# Patient Record
Sex: MALE | Race: WHITE | Employment: OTHER | URBAN - METROPOLITAN AREA
[De-identification: names, ages, dates, MRNs, and addresses within clinical notes are randomized per-mention and may not be internally consistent; named-entity substitution may affect disease eponyms.]

---

## 2017-01-10 ENCOUNTER — TELEPHONE (OUTPATIENT)
Dept: FAMILY MEDICINE | Facility: CLINIC | Age: 62
End: 2017-01-10

## 2017-01-10 DIAGNOSIS — K40.90 RIGHT INGUINAL HERNIA: Primary | ICD-10-CM

## 2017-01-10 NOTE — TELEPHONE ENCOUNTER
Patient given referral information and repeated the scheduling numbers back to me.  Mariely Ortiz RN

## 2017-01-10 NOTE — TELEPHONE ENCOUNTER
Reason for call: Other   Patient called regarding (reason for call): needs to have surgery done on his hernia   Additional comments:     Phone Number Pt can be reached at: Home number on file [phone] (home)  Best Time: anytime  Can we leave a detailed message on this number? YES

## 2017-01-12 ENCOUNTER — OFFICE VISIT (OUTPATIENT)
Dept: SURGERY | Facility: CLINIC | Age: 62
End: 2017-01-12
Payer: COMMERCIAL

## 2017-01-12 VITALS
WEIGHT: 155 LBS | BODY MASS INDEX: 24.91 KG/M2 | HEIGHT: 66 IN | SYSTOLIC BLOOD PRESSURE: 119 MMHG | DIASTOLIC BLOOD PRESSURE: 79 MMHG | HEART RATE: 71 BPM

## 2017-01-12 DIAGNOSIS — K40.91 UNILATERAL RECURRENT INGUINAL HERNIA WITHOUT OBSTRUCTION OR GANGRENE: Primary | ICD-10-CM

## 2017-01-12 PROCEDURE — 99204 OFFICE O/P NEW MOD 45 MIN: CPT | Performed by: SURGERY

## 2017-01-12 NOTE — MR AVS SNAPSHOT
After Visit Summary   1/12/2017    Arthur Jefferson    MRN: [id number]           Patient Information     Date Of Birth          1955        Visit Information        Provider Department      1/12/2017 9:00 AM Kwame Prado MD [facility]      Care Instructions    Your surgery is scheduled for 1/18/17 3:00 pm at [facility]        Follow-ups after your visit        Your next 10 appointments already scheduled     Jan 18, 2017  9:00 AM   (Arrive by 8:45 AM)   NEW HERNIA SURGERY with Sean Momin MD   [facility] ([facility])    [address]  4th Floor  [facility] [address] [phone]           Do not wear perfume.            Jan 18, 2017   Procedure with Kwame Prado MD   [facility] (--)    [address]  [facility] [address] [phone]              Who to contact     If you have questions or need follow up information about today's clinic visit or your schedule please contact [facility] directly at [phone].  Normal or non-critical lab and imaging results will be communicated to you by MyChart, letter or phone within 4 business days after the clinic has received the results. If you do not hear from us within 7 days, please contact the clinic through [employer] or phone. If you have a critical or abnormal lab result, we will notify you by phone as soon as possible.  Submit refill requests through [employer] or call your pharmacy and they will forward the refill request to us. Please allow 3 business days for your refill to be completed.          Additional Information About Your Visit        KlickExharPager Information     [employer] lets you send messages to your doctor, view your test results, renew your prescriptions, schedule appointments and more. To sign up, go to  [email or web address] . Click on \"Log in\" on the left side of the screen, which will take you to the Welcome page. Then click on \"Sign up Now\" on the right side of the page.     You will be asked to enter the access code listed below, as well as some personal information. Please follow the directions to create your username and password.     Your access code is: [email or web address]  Expires: 3/1/2017  9:22 AM     Your access code will  in 90 days. If you need help or a new code, please call your [facility] clinic or [phone].        Care EveryWhere ID     This is your Care EveryWhere ID. This could be used by other organizations to access your [facility] medical records  [id number]        Your Vitals Were     Pulse Height BMI (Body Mass Index)             71 5' 6\" (1.676 m) 25.03 kg/m2          Blood Pressure from Last 3 Encounters:   17 119/79   16 122/60   14 116/74    Weight from Last 3 Encounters:   17 155 lb (70.308 kg)   16 158 lb (71.668 kg)   10/24/11 160 lb (72.576 kg)              Today, you had the following     No orders found for display       Primary Care Provider Office Phone # Fax #    Naty Medeiros [phone]       [facility] [address]  [facility] [address]        Thank you!     Thank you for choosing [facility]  for your care. Our goal is always to provide you with excellent care. Hearing back from our patients is one way we can continue to improve our services. Please take a few minutes to complete the written survey that you may receive in the mail after your visit with us. Thank you!             Your Updated Medication List - Protect others around you: Learn how to safely use, store and throw away your medicines at www.disposemymeds.org.      Notice  As of 2017 10:26 AM    You have not been prescribed any medications.      "

## 2017-01-12 NOTE — PROGRESS NOTES
"[facility]  Surgery Consultation    HPI: Patient is a 62 year old male who is here for consultation requested by Naty Medeiros [phone] for evaluation of recurrent right inguinal hernia. He has a history of open repair and 1990 on the right side. He had a left laparoscopic repair in 2005. He noticed a few weeks ago that the right side popped open and he has had a palpable lump there since. He is always able to reduce the hernia but is having more symptoms from now. Pain is primarily located in the right groin. Patient states pain is worse with excercise. Pain is rated as mild. Symptoms are improved with rest and lying flat. Hernia has not been incarcerated. Patient has not had any symptoms of bowel obstruction. Patient denies fevers, chills, nausea, vomiting, SOB, chest pain, abdominal pain..    PMH:   has a past medical history of Hyperlipidemia LDL goal <130.  PSH:    has past surgical history that includes Herniorrhaphy inguinal.  Social History:   reports that he quit smoking about 8 years ago. He does not have any smokeless tobacco history on file. He reports that he drinks alcohol. He reports that he does not use illicit drugs.  Family History:   family history includes CANCER in his mother; Prostate Cancer in his father. There is no history of Asthma, DIABETES, Hypertension, CEREBROVASCULAR DISEASE, Breast Cancer, Cancer - colorectal, Alcohol/Drug, Allergies, Alzheimer Disease, Arthritis, Blood Disease, Cardiovascular, Depression, Eye Disorder, GASTROINTESTINAL DISEASE, HEART DISEASE, Lipids, Musculoskeletal Disorder, Neurologic Disorder, Obesity, Psychotic Disorder, Respiratory, or Thyroid Disease.  Medications/Allergies: Home medications and allergies reviewed.    ROS:  The 10 point Review of Systems is negative other than noted in the HPI.    Physical Exam:  /79 mmHg  Pulse 71  Ht 5' 6\" (1.676 m)  Wt 155 lb (70.308 kg)  BMI 25.03 kg/m2  GENERAL: Generally appears " well.  Psych: Alert and Oriented.  Normal affect  Eyes: Sclera clear  Respiratory:  Lungs clear to ausculation bilaterally with good air excursion  Cardiovascular:  Regular Rate and Rhythm with no murmurs gallops or rubs, normal peripheral pulses  GI: Abdomen Soft Non-Tender entire abdomen No hernias palpated..  Groin- I examined the patient in both the standing and supine positions. Right Groin- Moderate sized inguinal hernia.  Hernia was easily reduciable.  Tenderness palpated in groin. Left Groin- No hernia Palpated. No scrotal or testicle abnormalities.  Lymphatic/Hematologic/Immune:  No femoral or cervical lymphadenopathy.  Integumentary:  No rashes  Neurological: grossly intact     All new lab and imaging data was reviewed.     Impression and Plan:  Patient is a 62 year old male with recurrent right inguinal hernia    PLAN:   I discussed the pathophysiology of hernias and options for repair including laparoscopic VS open. The patient has a recurrent right inguinal hernia. He has had an open repair on this side and a laparoscopic repair of the left side. I would recommend going forward with a laparoscopic repair with mesh if possible. He understands if we cannot get in the appropriate planes that we would need to possibly convert open. He also is aware that the complication rate is higher given his previous repairs in the past. He would like to go forward in the near future as its hindering his current work schedule. He can schedule at his convenience. The risks associated with the procedure including, but not limited to, recurrence, nerve entrapment or injury, persistence of pain, injury to the bowel/bladder, infertility, hematoma, mesh migration, mesh infection, MI, and PE were discussed with the patient. He indicated understanding of the discussion, asked appropriate questions, and provided consent. Signs and symptoms of incarceration were discussed. If these develop in the interim, he promises to call or go  straight to the ER. I have provided the patient with an information pamphlet.      Thank you very much for this consult.    Kwame Prado M.D.  [facility]  [phone]    Please route or send letter to:  Primary Care Provider (PCP) and Referring Provider    HPI      ROS      Physical Exam

## 2017-01-12 NOTE — Clinical Note
"  2017    [facility]  Surgery Consultation    RE:  Arthur Jefferson-:  [birth year]    HPI: Patient is a 62 year old male who is here for consultation requested by Naty Medeiros [phone] for evaluation of recurrent right inguinal hernia. He has a history of open repair and 1990 on the right side. He had a left laparoscopic repair in . He noticed a few weeks ago that the right side popped open and he has had a palpable lump there since. He is always able to reduce the hernia but is having more symptoms from now. Pain is primarily located in the right groin. Patient states pain is worse with excercise. Pain is rated as mild. Symptoms are improved with rest and lying flat. Hernia has not been incarcerated. Patient has not had any symptoms of bowel obstruction. Patient denies fevers, chills, nausea, vomiting, SOB, chest pain, abdominal pain..    PMH:   has a past medical history of Hyperlipidemia LDL goal <130.  PSH:    has past surgical history that includes Herniorrhaphy inguinal.  Social History:   reports that he quit smoking about 8 years ago. He does not have any smokeless tobacco history on file. He reports that he drinks alcohol. He reports that he does not use illicit drugs.  Family History:   family history includes CANCER in his mother; Prostate Cancer in his father. There is no history of Asthma, DIABETES, Hypertension, CEREBROVASCULAR DISEASE, Breast Cancer, Cancer - colorectal, Alcohol/Drug, Allergies, Alzheimer Disease, Arthritis, Blood Disease, Cardiovascular, Depression, Eye Disorder, GASTROINTESTINAL DISEASE, HEART DISEASE, Lipids, Musculoskeletal Disorder, Neurologic Disorder, Obesity, Psychotic Disorder, Respiratory, or Thyroid Disease.  Medications/Allergies: Home medications and allergies reviewed.    ROS:  The 10 point Review of Systems is negative other than noted in the HPI.    Physical Exam:  /79 mmHg  Pulse 71  Ht 5' 6\" (1.676 m)  Wt 155 lb " (70.308 kg)  BMI 25.03 kg/m2  GENERAL: Generally appears well.  Psych: Alert and Oriented.  Normal affect  Eyes: Sclera clear  Respiratory:  Lungs clear to ausculation bilaterally with good air excursion  Cardiovascular:  Regular Rate and Rhythm with no murmurs gallops or rubs, normal peripheral pulses  GI: Abdomen Soft Non-Tender entire abdomen No hernias palpated..  Groin- I examined the patient in both the standing and supine positions. Right Groin- Moderate sized inguinal hernia.  Hernia was easily reduciable.  Tenderness palpated in groin. Left Groin- No hernia Palpated. No scrotal or testicle abnormalities.  Lymphatic/Hematologic/Immune:  No femoral or cervical lymphadenopathy.  Integumentary:  No rashes  Neurological: grossly intact     All new lab and imaging data was reviewed.     Impression and Plan:  Patient is a 62 year old male with recurrent right inguinal hernia    PLAN:   I discussed the pathophysiology of hernias and options for repair including laparoscopic VS open. The patient has a recurrent right inguinal hernia. He has had an open repair on this side and a laparoscopic repair of the left side. I would recommend going forward with a laparoscopic repair with mesh if possible. He understands if we cannot get in the appropriate planes that we would need to possibly convert open. He also is aware that the complication rate is higher given his previous repairs in the past. He would like to go forward in the near future as its hindering his current work schedule. He can schedule at his convenience. The risks associated with the procedure including, but not limited to, recurrence, nerve entrapment or injury, persistence of pain, injury to the bowel/bladder, infertility, hematoma, mesh migration, mesh infection, MI, and PE were discussed with the patient. He indicated understanding of the discussion, asked appropriate questions, and provided consent. Signs and symptoms of incarceration were discussed.  If these develop in the interim, he promises to call or go straight to the ER. I have provided the patient with an information pamphlet.      Thank you very much for this consult.    Kwame Prado M.D.  [facility]  [phone]

## 2017-01-16 ENCOUNTER — OFFICE VISIT (OUTPATIENT)
Dept: FAMILY MEDICINE | Facility: CLINIC | Age: 62
End: 2017-01-16
Payer: COMMERCIAL

## 2017-01-16 VITALS
BODY MASS INDEX: 26 KG/M2 | OXYGEN SATURATION: 98 % | RESPIRATION RATE: 16 BRPM | WEIGHT: 161 LBS | HEART RATE: 68 BPM | SYSTOLIC BLOOD PRESSURE: 100 MMHG | DIASTOLIC BLOOD PRESSURE: 70 MMHG | TEMPERATURE: 97.7 F

## 2017-01-16 DIAGNOSIS — Z01.818 PREOP GENERAL PHYSICAL EXAM: Primary | ICD-10-CM

## 2017-01-16 DIAGNOSIS — K40.90 RIGHT INGUINAL HERNIA: ICD-10-CM

## 2017-01-16 LAB — HGB BLD-MCNC: 14.6 G/DL (ref 13.3–17.7)

## 2017-01-16 PROCEDURE — 99214 OFFICE O/P EST MOD 30 MIN: CPT | Performed by: FAMILY MEDICINE

## 2017-01-16 PROCEDURE — 36415 COLL VENOUS BLD VENIPUNCTURE: CPT | Performed by: FAMILY MEDICINE

## 2017-01-16 PROCEDURE — 85018 HEMOGLOBIN: CPT | Performed by: FAMILY MEDICINE

## 2017-01-16 PROCEDURE — 93000 ELECTROCARDIOGRAM COMPLETE: CPT | Performed by: FAMILY MEDICINE

## 2017-01-16 NOTE — INTERVAL H&P NOTE
This note is for the purpose of making the H&P performed in the clinic within the last 30 days available in the hospital surgical encounter.

## 2017-01-16 NOTE — PROGRESS NOTES
[place] [id number] [place] [address] [phone]  Dept: [phone]    PRE-OP EVALUATION:  Today's date: 2017    Arthur Jefferson (: 1955) presents for pre-operative evaluation assessment as requested by Kwame Ugarte MD.  He requires evaluation and anesthesia risk assessment prior to undergoing surgery/procedure for treatment of recurrent right inguinal hernia.  Proposed procedure: Laparoscopic right inguinal hernia repair with mesh    Date of Surgery/ Procedure: 2017  Time of Surgery/ Procedure: 2:55 pm   Hospital/Surgical [facility]  Primary Physician: Naty Medeiros  Type of Anesthesia Anticipated: General    Patient has a Health Care Directive or Living Will:  NO    1. NO - Do you have a history of heart attack, stroke, stent, bypass or surgery on an artery in the head, neck, heart or legs?  2. NO - Do you ever have any pain or discomfort in your chest?  3. NO - Do you have a history of  Heart Failure?  4. NO - Are you troubled by shortness of breath when: walking on the level, up a slight hill or at night?  5. NO - Do you currently have a cold, bronchitis or other respiratory infection?  6. NO - Do you have a cough, shortness of breath or wheezing?  7. NO - Do you sometimes get pains in the calves of your legs when you walk?  8. NO - Do you or anyone in your family have previous history of blood clots?  9. NO - Do you or does anyone in your family have a serious bleeding problem such as prolonged bleeding following surgeries or cuts?  10. NO - Have you ever had problems with anemia or been told to take iron pills?  11. NO - Have you had any abnormal blood loss such as black, tarry or bloody stools, or abnormal vaginal bleeding?  12. NO - Have you ever had a blood transfusion?  13. NO - Have you or any of your relatives ever had problems with anesthesia?  14. NO - Do you have sleep apnea, excessive snoring or daytime  drowsiness?  15. NO - Do you have any prosthetic heart valves?  16. NO - Do you have prosthetic joints?  17. NO - Is there any chance that you may be pregnant?      HPI:                                                      Brief HPI related to upcoming procedure: Had bilateral inguinal hernia repair years ago.  Recently re-developed right inguinal hernia.  Has had a couple episodes of incarceration that he has reduced himself.   Both brother and father have had multiple hernias.        See problem list for active medical problems.  Problems all longstanding and stable, except as noted/documented.  See ROS for pertinent symptoms related to these conditions.                   MEDICAL HISTORY:                                                      Patient Active Problem List    Diagnosis Date Noted     Elevated fasting glucose 12/02/2016     Priority: Medium     Mixed hyperlipidemia 05/09/2010     Priority: Medium      Past Medical History   Diagnosis Date     Hyperlipidemia LDL goal <130      Past Surgical History   Procedure Laterality Date     Herniorrhaphy inguinal       bilateral, one open, one laparoscopic         No current outpatient prescriptions on file.     OTC products: no recent use of OTC ASA, NSAIDS or Steroids    No Known Allergies   Latex Allergy: NO    Social History   Substance Use Topics     Smoking status: Light Tobacco Smoker     Last Attempt to Quit: 11/30/2008     Smokeless tobacco: Not on file     Alcohol Use: 1.2 oz/week     2 Standard drinks or equivalent per week     History   Drug Use No       REVIEW OF SYSTEMS:                                                    ROS:  CONST: NEGATIVE for fevers/chills/sweats, unexplained weight loss/gain, and fatigue  EYES: NEGATIVE for change in vision  ENT: NEGATIVE for difficulty hearing/tinnitus, and problems with teeth/gums  BREAST: NEGATIVE for breast lump/discharge  CV: NEGATIVE for chest pain/discomfort, leg pain with exercise, and palpitations  RESP:  NEGATIVE for cough/wheeze, and difficulty breathing  GI: NEGATIVE for abdominal pain, blood in bowel movement, and nausea/vomiting/diarrhea  : NEGATIVE for nighttime urination, leaking urine, unusual vaginal bleeding, penile/vaginal discharge, and concerns about sexual function  MS: NEGATIVE for muscle/joint pain  SKIN: NEGATIVE for rash or mole change  NEURO: NEGATIVE for headache, dizziness/lightheadedness, numbness, memory loss, and loss of coordination  PSYCH: NEGATIVE for anxiety/stress, problems with sleep, and depression  HEME: NEGATIVE for unexplained lumps, and easy bruising/bleeding  ENDO: NEGATIVE for excessive thirst or urination  ALL: NEGATIVE for hay fever/allergies     EXAM:                                                    /70 mmHg  Pulse 68  Temp(Src) 97.7  F (36.5  C) (Oral)  Resp 16  Wt 161 lb (73.029 kg)  SpO2 98%  GEN:  no apparent distress  EYES: PERRL, conjunctivae and sclerae clear   ENT: external ears and nose without lesions or scars, TM's and canals clear bilaterally, oropharynx clear with moist mucus membranes and normal landmarks and lips, teeth, and gums with no abnormalities noted   NECK:  Supple without adenopathy, mass, or thyromegaly   LUNGS:  normal respiratory effort, and lungs clear to auscultation bilaterally - no rales, rhonchi or wheezes  CV: regular rate and rhythm, normal S1 S2, no S3 or S4, no murmur, click or rub and bilateral lower extremities without edema   ABD:  soft, nontender, no hepatosplenomegaly  SKIN:  normal to inspection and palpation, no rashes or abnormal-appearing lesions   PSYCH: mood/affect appear normal/bright     DIAGNOSTICS:                                                      EKG: appears normal, NSR, normal axis, normal intervals, no acute ST/T changes c/w ischemia, no LVH by voltage criteria    Labs Resulted Today:   Results for orders placed or performed in visit on 01/16/17   Hemoglobin   Result Value Ref Range    Hemoglobin 14.6 13.3  - 17.7 g/dL       Recent Labs   Lab Test  12/01/16   0933  09/05/14   2245   HGB   --   15.1   PLT   --   233   NA  141  137   POTASSIUM  4.6  3.6   CR  0.80  0.84        IMPRESSION:                                                    Reason for surgery/procedure: recurrent right inguinal hernia  Diagnosis/reason for consult: preoperative assessment    The proposed surgical procedure is considered INTERMEDIATE risk.    REVISED CARDIAC RISK INDEX  The patient has the following serious cardiovascular risks for perioperative complications such as (MI, PE, VFib and 3  AV Block):  No serious cardiac risks  INTERPRETATION: 0 risks: Class I (very low risk - 0.4% complication rate)    The patient has the following additional risks for perioperative complications:  No identified additional risks      ICD-10-CM    1. Preop general physical exam Z01.818 Hemoglobin     EKG 12-lead complete w/read - Clinics   2. Right inguinal hernia K40.90        RECOMMENDATIONS:                                                          APPROVAL GIVEN to proceed with proposed procedure, without further diagnostic evaluation       Signed Electronically by: Naty Medeiros MD    Copy of this evaluation report is provided to requesting physician.    [facility] Preop Guidelines

## 2017-01-16 NOTE — H&P (VIEW-ONLY)
"[facility]  Surgery Consultation    HPI: Patient is a 62 year old male who is here for consultation requested by Naty Medeiros [phone] for evaluation of recurrent right inguinal hernia. He has a history of open repair and 1990 on the right side. He had a left laparoscopic repair in 2005. He noticed a few weeks ago that the right side popped open and he has had a palpable lump there since. He is always able to reduce the hernia but is having more symptoms from now. Pain is primarily located in the right groin. Patient states pain is worse with excercise. Pain is rated as mild. Symptoms are improved with rest and lying flat. Hernia has not been incarcerated. Patient has not had any symptoms of bowel obstruction. Patient denies fevers, chills, nausea, vomiting, SOB, chest pain, abdominal pain..    PMH:   has a past medical history of Hyperlipidemia LDL goal <130.  PSH:    has past surgical history that includes Herniorrhaphy inguinal.  Social History:   reports that he quit smoking about 8 years ago. He does not have any smokeless tobacco history on file. He reports that he drinks alcohol. He reports that he does not use illicit drugs.  Family History:   family history includes CANCER in his mother; Prostate Cancer in his father. There is no history of Asthma, DIABETES, Hypertension, CEREBROVASCULAR DISEASE, Breast Cancer, Cancer - colorectal, Alcohol/Drug, Allergies, Alzheimer Disease, Arthritis, Blood Disease, Cardiovascular, Depression, Eye Disorder, GASTROINTESTINAL DISEASE, HEART DISEASE, Lipids, Musculoskeletal Disorder, Neurologic Disorder, Obesity, Psychotic Disorder, Respiratory, or Thyroid Disease.  Medications/Allergies: Home medications and allergies reviewed.    ROS:  The 10 point Review of Systems is negative other than noted in the HPI.    Physical Exam:  /79 mmHg  Pulse 71  Ht 5' 6\" (1.676 m)  Wt 155 lb (70.308 kg)  BMI 25.03 kg/m2  GENERAL: Generally appears " well.  Psych: Alert and Oriented.  Normal affect  Eyes: Sclera clear  Respiratory:  Lungs clear to ausculation bilaterally with good air excursion  Cardiovascular:  Regular Rate and Rhythm with no murmurs gallops or rubs, normal peripheral pulses  GI: Abdomen Soft Non-Tender entire abdomen No hernias palpated..  Groin- I examined the patient in both the standing and supine positions. Right Groin- Moderate sized inguinal hernia.  Hernia was easily reduciable.  Tenderness palpated in groin. Left Groin- No hernia Palpated. No scrotal or testicle abnormalities.  Lymphatic/Hematologic/Immune:  No femoral or cervical lymphadenopathy.  Integumentary:  No rashes  Neurological: grossly intact     All new lab and imaging data was reviewed.     Impression and Plan:  Patient is a 62 year old male with recurrent right inguinal hernia    PLAN:   I discussed the pathophysiology of hernias and options for repair including laparoscopic VS open. The patient has a recurrent right inguinal hernia. He has had an open repair on this side and a laparoscopic repair of the left side. I would recommend going forward with a laparoscopic repair with mesh if possible. He understands if we cannot get in the appropriate planes that we would need to possibly convert open. He also is aware that the complication rate is higher given his previous repairs in the past. He would like to go forward in the near future as its hindering his current work schedule. He can schedule at his convenience. The risks associated with the procedure including, but not limited to, recurrence, nerve entrapment or injury, persistence of pain, injury to the bowel/bladder, infertility, hematoma, mesh migration, mesh infection, MI, and PE were discussed with the patient. He indicated understanding of the discussion, asked appropriate questions, and provided consent. Signs and symptoms of incarceration were discussed. If these develop in the interim, he promises to call or go  straight to the ER. I have provided the patient with an information pamphlet.      Thank you very much for this consult.    Kwame Prado M.D.  [facility]  [phone]    Please route or send letter to:  Primary Care Provider (PCP) and Referring Provider    HPI      ROS      Physical Exam

## 2017-01-16 NOTE — MR AVS SNAPSHOT
After Visit Summary   1/16/2017    Arthur Jefferson    MRN: [id number]           Patient Information     Date Of Birth          1955        Visit Information        Provider Department      1/16/2017 4:00 PM Naty Medeiros MD [facility]        Today's Diagnoses     Preop general physical exam    -  1     Right inguinal hernia           Care Instructions      Before Your Surgery      Call your surgeon if there is any change in your health. This includes signs of a cold or flu (such as a sore throat, runny nose, cough, rash or fever).    Do not smoke, drink alcohol or take over the counter medicine (unless your surgeon or primary care doctor tells you to) for the 24 hours before and after surgery.    If you take prescribed drugs: Follow your doctor s orders about which medicines to take and which to stop until after surgery.    Eating and drinking prior to surgery: follow the instructions from your surgeon    Take a shower or bath the night before surgery. Use the soap your surgeon gave you to gently clean your skin. If you do not have soap from your surgeon, use your regular soap. Do not shave or scrub the surgery site.  Wear clean pajamas and have clean sheets on your bed.         Follow-ups after your visit        Your next 10 appointments already scheduled     Jan 18, 2017  2:45 PM   [facility] Same Day Surgery with Kwame Prado MD   [facility])    [facility])   [phone]            Jan 18, 2017   Procedure with Kwame Prado MD   [facility] PeriOP Services (--)    [address]  [facility] [id number] [phone]              Who to contact     If you have questions or need follow up information about today's clinic visit or your schedule please contact [facility] directly at [phone].  Normal or  "non-critical lab and imaging results will be communicated to you by MyChart, letter or phone within 4 business days after the clinic has received the results. If you do not hear from us within 7 days, please contact the clinic through InOpent or phone. If you have a critical or abnormal lab result, we will notify you by phone as soon as possible.  Submit refill requests through Attentio or call your pharmacy and they will forward the refill request to us. Please allow 3 business days for your refill to be completed.          Additional Information About Your Visit        Attentio Information     Attentio lets you send messages to your doctor, view your test results, renew your prescriptions, schedule appointments and more. To sign up, go to [email or web address] . Click on \"Log in\" on the left side of the screen, which will take you to the Welcome page. Then click on \"Sign up Now\" on the right side of the page.     You will be asked to enter the access code listed below, as well as some personal information. Please follow the directions to create your username and password.     Your access code is: [email or web address]  Expires: 3/1/2017  9:22 AM     Your access code will  in 90 days. If you need help or a new code, please call your [facility] clinic or [phone].        Care EveryWhere ID     This is your Care EveryWhere ID. This could be used by other organizations to access your [facility] medical records  [id number]        Your Vitals Were     Pulse Temperature Respirations Pulse Oximetry          68 97.7  F (36.5  C) (Oral) 16 98%         Blood Pressure from Last 3 Encounters:   17 100/70   17 119/79   16 122/60    Weight from Last 3 Encounters:   17 161 lb (73.029 kg)   17 155 lb (70.308 kg)   16 158 lb (71.668 kg)              We Performed the Following     EKG 12-lead complete w/read - Clinics     Hemoglobin        Primary Care Provider Office Phone # Fax #    Naty BURCH " MD Mala [phone]       [facility] [address]  [facility] [address]        Thank you!     Thank you for choosing [facility]  for your care. Our goal is always to provide you with excellent care. Hearing back from our patients is one way we can continue to improve our services. Please take a few minutes to complete the written survey that you may receive in the mail after your visit with us. Thank you!             Your Updated Medication List - Protect others around you: Learn how to safely use, store and throw away your medicines at www.disposemymeds.org.      Notice  As of 1/16/2017  5:00 PM    You have not been prescribed any medications.

## 2017-01-16 NOTE — NURSING NOTE
"Chief Complaint   Patient presents with     Pre-Op Exam       Initial /70 mmHg  Pulse 68  Temp(Src) 97.7  F (36.5  C) (Oral)  Resp 16  Wt 161 lb (73.029 kg)  SpO2 98% Estimated body mass index is 26 kg/(m^2) as calculated from the following:    Height as of 1/12/17: 5' 6\" (1.676 m).    Weight as of this encounter: 161 lb (73.029 kg).  BP completed using cuff size: michael Ambrocio CMA      "

## 2017-01-17 NOTE — H&P (VIEW-ONLY)
[place] [id number] [place] [id number] [phone]  Dept: [phone]    PRE-OP EVALUATION:  Today's date: 2017    Arthur Jefferson (: 1955) presents for pre-operative evaluation assessment as requested by Kwame Ugarte MD.  He requires evaluation and anesthesia risk assessment prior to undergoing surgery/procedure for treatment of recurrent right inguinal hernia.  Proposed procedure: Laparoscopic right inguinal hernia repair with mesh    Date of Surgery/ Procedure: 2017  Time of Surgery/ Procedure: 2:55 pm   Hospital/Surgical Facility: [facility]  Primary Physician: Naty Medeiros  Type of Anesthesia Anticipated: General    Patient has a Health Care Directive or Living Will:  NO    1. NO - Do you have a history of heart attack, stroke, stent, bypass or surgery on an artery in the head, neck, heart or legs?  2. NO - Do you ever have any pain or discomfort in your chest?  3. NO - Do you have a history of  Heart Failure?  4. NO - Are you troubled by shortness of breath when: walking on the level, up a slight hill or at night?  5. NO - Do you currently have a cold, bronchitis or other respiratory infection?  6. NO - Do you have a cough, shortness of breath or wheezing?  7. NO - Do you sometimes get pains in the calves of your legs when you walk?  8. NO - Do you or anyone in your family have previous history of blood clots?  9. NO - Do you or does anyone in your family have a serious bleeding problem such as prolonged bleeding following surgeries or cuts?  10. NO - Have you ever had problems with anemia or been told to take iron pills?  11. NO - Have you had any abnormal blood loss such as black, tarry or bloody stools, or abnormal vaginal bleeding?  12. NO - Have you ever had a blood transfusion?  13. NO - Have you or any of your relatives ever had problems with anesthesia?  14. NO - Do you have sleep apnea, excessive snoring or daytime  drowsiness?  15. NO - Do you have any prosthetic heart valves?  16. NO - Do you have prosthetic joints?  17. NO - Is there any chance that you may be pregnant?      HPI:                                                      Brief HPI related to upcoming procedure: Had bilateral inguinal hernia repair years ago.  Recently re-developed right inguinal hernia.  Has had a couple episodes of incarceration that he has reduced himself.   Both brother and father have had multiple hernias.        See problem list for active medical problems.  Problems all longstanding and stable, except as noted/documented.  See ROS for pertinent symptoms related to these conditions.                   MEDICAL HISTORY:                                                      Patient Active Problem List    Diagnosis Date Noted     Elevated fasting glucose 12/02/2016     Priority: Medium     Mixed hyperlipidemia 05/09/2010     Priority: Medium      Past Medical History   Diagnosis Date     Hyperlipidemia LDL goal <130      Past Surgical History   Procedure Laterality Date     Herniorrhaphy inguinal       bilateral, one open, one laparoscopic         No current outpatient prescriptions on file.     OTC products: no recent use of OTC ASA, NSAIDS or Steroids    No Known Allergies   Latex Allergy: NO    Social History   Substance Use Topics     Smoking status: Light Tobacco Smoker     Last Attempt to Quit: 11/30/2008     Smokeless tobacco: Not on file     Alcohol Use: 1.2 oz/week     2 Standard drinks or equivalent per week     History   Drug Use No       REVIEW OF SYSTEMS:                                                    ROS:  CONST: NEGATIVE for fevers/chills/sweats, unexplained weight loss/gain, and fatigue  EYES: NEGATIVE for change in vision  ENT: NEGATIVE for difficulty hearing/tinnitus, and problems with teeth/gums  BREAST: NEGATIVE for breast lump/discharge  CV: NEGATIVE for chest pain/discomfort, leg pain with exercise, and palpitations  RESP:  NEGATIVE for cough/wheeze, and difficulty breathing  GI: NEGATIVE for abdominal pain, blood in bowel movement, and nausea/vomiting/diarrhea  : NEGATIVE for nighttime urination, leaking urine, unusual vaginal bleeding, penile/vaginal discharge, and concerns about sexual function  MS: NEGATIVE for muscle/joint pain  SKIN: NEGATIVE for rash or mole change  NEURO: NEGATIVE for headache, dizziness/lightheadedness, numbness, memory loss, and loss of coordination  PSYCH: NEGATIVE for anxiety/stress, problems with sleep, and depression  HEME: NEGATIVE for unexplained lumps, and easy bruising/bleeding  ENDO: NEGATIVE for excessive thirst or urination  ALL: NEGATIVE for hay fever/allergies     EXAM:                                                    /70 mmHg  Pulse 68  Temp(Src) 97.7  F (36.5  C) (Oral)  Resp 16  Wt 161 lb (73.029 kg)  SpO2 98%  GEN:  no apparent distress  EYES: PERRL, conjunctivae and sclerae clear   ENT: external ears and nose without lesions or scars, TM's and canals clear bilaterally, oropharynx clear with moist mucus membranes and normal landmarks and lips, teeth, and gums with no abnormalities noted   NECK:  Supple without adenopathy, mass, or thyromegaly   LUNGS:  normal respiratory effort, and lungs clear to auscultation bilaterally - no rales, rhonchi or wheezes  CV: regular rate and rhythm, normal S1 S2, no S3 or S4, no murmur, click or rub and bilateral lower extremities without edema   ABD:  soft, nontender, no hepatosplenomegaly  SKIN:  normal to inspection and palpation, no rashes or abnormal-appearing lesions   PSYCH: mood/affect appear normal/bright     DIAGNOSTICS:                                                      EKG: appears normal, NSR, normal axis, normal intervals, no acute ST/T changes c/w ischemia, no LVH by voltage criteria    Labs Resulted Today:   Results for orders placed or performed in visit on 01/16/17   Hemoglobin   Result Value Ref Range    Hemoglobin 14.6 13.3  - 17.7 g/dL       Recent Labs   Lab Test  12/01/16   0933  09/05/14   2245   HGB   --   15.1   PLT   --   233   NA  141  137   POTASSIUM  4.6  3.6   CR  0.80  0.84        IMPRESSION:                                                    Reason for surgery/procedure: recurrent right inguinal hernia  Diagnosis/reason for consult: preoperative assessment    The proposed surgical procedure is considered INTERMEDIATE risk.    REVISED CARDIAC RISK INDEX  The patient has the following serious cardiovascular risks for perioperative complications such as (MI, PE, VFib and 3  AV Block):  No serious cardiac risks  INTERPRETATION: 0 risks: Class I (very low risk - 0.4% complication rate)    The patient has the following additional risks for perioperative complications:  No identified additional risks      ICD-10-CM    1. Preop general physical exam Z01.818 Hemoglobin     EKG 12-lead complete w/read - Clinics   2. Right inguinal hernia K40.90        RECOMMENDATIONS:                                                          APPROVAL GIVEN to proceed with proposed procedure, without further diagnostic evaluation       Signed Electronically by: Naty Medeiros MD    Copy of this evaluation report is provided to requesting physician.    [facility] Preop Guidelines

## 2017-01-18 ENCOUNTER — OFFICE VISIT (OUTPATIENT)
Dept: SURGERY | Facility: PHYSICIAN GROUP | Age: 62
End: 2017-01-18
Payer: COMMERCIAL

## 2017-01-18 ENCOUNTER — HOSPITAL ENCOUNTER (OUTPATIENT)
Facility: CLINIC | Age: 62
Discharge: HOME | End: 2017-01-18
Attending: SURGERY | Admitting: SURGERY
Payer: COMMERCIAL

## 2017-01-18 ENCOUNTER — SURGERY (OUTPATIENT)
Age: 62
End: 2017-01-18

## 2017-01-18 ENCOUNTER — ANESTHESIA (OUTPATIENT)
Dept: SURGERY | Facility: CLINIC | Age: 62
End: 2017-01-18
Payer: COMMERCIAL

## 2017-01-18 ENCOUNTER — ANESTHESIA EVENT (OUTPATIENT)
Dept: SURGERY | Facility: CLINIC | Age: 62
End: 2017-01-18
Payer: COMMERCIAL

## 2017-01-18 VITALS
RESPIRATION RATE: 16 BRPM | DIASTOLIC BLOOD PRESSURE: 80 MMHG | WEIGHT: 158 LBS | OXYGEN SATURATION: 96 % | HEIGHT: 66 IN | SYSTOLIC BLOOD PRESSURE: 127 MMHG | BODY MASS INDEX: 25.39 KG/M2 | TEMPERATURE: 98.7 F

## 2017-01-18 DIAGNOSIS — G89.18 ACUTE POST-OPERATIVE PAIN: ICD-10-CM

## 2017-01-18 DIAGNOSIS — K40.91 RECURRENT RIGHT INGUINAL HERNIA: Primary | ICD-10-CM

## 2017-01-18 PROCEDURE — 25000125 ZZHC RX 250: Performed by: SURGERY

## 2017-01-18 PROCEDURE — 37000008 ZZH ANESTHESIA TECHNICAL FEE, 1ST 30 MIN: Performed by: SURGERY

## 2017-01-18 PROCEDURE — 40000170 ZZH STATISTIC PRE-PROCEDURE ASSESSMENT II: Performed by: SURGERY

## 2017-01-18 PROCEDURE — 71000012 ZZH RECOVERY PHASE 1 LEVEL 1 FIRST HR: Performed by: SURGERY

## 2017-01-18 PROCEDURE — 36000058 ZZH SURGERY LEVEL 3 EA 15 ADDTL MIN: Performed by: SURGERY

## 2017-01-18 PROCEDURE — 71000027 ZZH RECOVERY PHASE 2 EACH 15 MINS: Performed by: SURGERY

## 2017-01-18 PROCEDURE — 36000056 ZZH SURGERY LEVEL 3 1ST 30 MIN: Performed by: SURGERY

## 2017-01-18 PROCEDURE — 25800025 ZZH RX 258: Performed by: NURSE ANESTHETIST, CERTIFIED REGISTERED

## 2017-01-18 PROCEDURE — C1781 MESH (IMPLANTABLE): HCPCS | Performed by: SURGERY

## 2017-01-18 PROCEDURE — 27210794 ZZH OR GENERAL SUPPLY STERILE: Performed by: SURGERY

## 2017-01-18 PROCEDURE — 25000128 H RX IP 250 OP 636: Performed by: NURSE ANESTHETIST, CERTIFIED REGISTERED

## 2017-01-18 PROCEDURE — 25000132 ZZH RX MED GY IP 250 OP 250 PS 637: Performed by: PHYSICIAN ASSISTANT

## 2017-01-18 PROCEDURE — 25000125 ZZHC RX 250: Performed by: NURSE ANESTHETIST, CERTIFIED REGISTERED

## 2017-01-18 PROCEDURE — 25000566 ZZH SEVOFLURANE, EA 15 MIN: Performed by: SURGERY

## 2017-01-18 PROCEDURE — 49651 LAP ING HERNIA REPAIR RECUR: CPT | Performed by: SURGERY

## 2017-01-18 PROCEDURE — 37000009 ZZH ANESTHESIA TECHNICAL FEE, EACH ADDTL 15 MIN: Performed by: SURGERY

## 2017-01-18 DEVICE — MESH PROGRIP LAPAROSCOPIC 5.9X3.9" PARIETEX SELF-FIX LPG1510: Type: IMPLANTABLE DEVICE | Site: GROIN | Status: FUNCTIONAL

## 2017-01-18 RX ORDER — CEFAZOLIN SODIUM 1 G/3ML
1 INJECTION, POWDER, FOR SOLUTION INTRAMUSCULAR; INTRAVENOUS SEE ADMIN INSTRUCTIONS
Status: DISCONTINUED | OUTPATIENT
Start: 2017-01-18 | End: 2017-01-18 | Stop reason: HOSPADM

## 2017-01-18 RX ORDER — GLYCOPYRROLATE 0.2 MG/ML
INJECTION, SOLUTION INTRAMUSCULAR; INTRAVENOUS PRN
Status: DISCONTINUED | OUTPATIENT
Start: 2017-01-18 | End: 2017-01-18

## 2017-01-18 RX ORDER — BUPIVACAINE HYDROCHLORIDE AND EPINEPHRINE 2.5; 5 MG/ML; UG/ML
INJECTION, SOLUTION EPIDURAL; INFILTRATION; INTRACAUDAL; PERINEURAL
Status: DISCONTINUED
Start: 2017-01-18 | End: 2017-01-18 | Stop reason: HOSPADM

## 2017-01-18 RX ORDER — FENTANYL CITRATE 50 UG/ML
INJECTION, SOLUTION INTRAMUSCULAR; INTRAVENOUS PRN
Status: DISCONTINUED | OUTPATIENT
Start: 2017-01-18 | End: 2017-01-18

## 2017-01-18 RX ORDER — CEFAZOLIN SODIUM 2 G/100ML
2 INJECTION, SOLUTION INTRAVENOUS
Status: COMPLETED | OUTPATIENT
Start: 2017-01-18 | End: 2017-01-18

## 2017-01-18 RX ORDER — HYDROCODONE BITARTRATE AND ACETAMINOPHEN 5; 325 MG/1; MG/1
1-2 TABLET ORAL EVERY 4 HOURS PRN
Qty: 25 TABLET | Refills: 0 | Status: SHIPPED | OUTPATIENT
Start: 2017-01-18 | End: 2017-06-16

## 2017-01-18 RX ORDER — ONDANSETRON 2 MG/ML
INJECTION INTRAMUSCULAR; INTRAVENOUS PRN
Status: DISCONTINUED | OUTPATIENT
Start: 2017-01-18 | End: 2017-01-18

## 2017-01-18 RX ORDER — PHYSOSTIGMINE SALICYLATE 1 MG/ML
1.2 INJECTION INTRAVENOUS
Status: DISCONTINUED | OUTPATIENT
Start: 2017-01-18 | End: 2017-01-18 | Stop reason: HOSPADM

## 2017-01-18 RX ORDER — SODIUM CHLORIDE, SODIUM LACTATE, POTASSIUM CHLORIDE, CALCIUM CHLORIDE 600; 310; 30; 20 MG/100ML; MG/100ML; MG/100ML; MG/100ML
1000 INJECTION, SOLUTION INTRAVENOUS CONTINUOUS
Status: DISCONTINUED | OUTPATIENT
Start: 2017-01-18 | End: 2017-01-18 | Stop reason: HOSPADM

## 2017-01-18 RX ORDER — MEPERIDINE HYDROCHLORIDE 25 MG/ML
12.5 INJECTION INTRAMUSCULAR; INTRAVENOUS; SUBCUTANEOUS
Status: DISCONTINUED | OUTPATIENT
Start: 2017-01-18 | End: 2017-01-18 | Stop reason: HOSPADM

## 2017-01-18 RX ORDER — FENTANYL CITRATE 50 UG/ML
25-50 INJECTION, SOLUTION INTRAMUSCULAR; INTRAVENOUS
Status: DISCONTINUED | OUTPATIENT
Start: 2017-01-18 | End: 2017-01-18 | Stop reason: HOSPADM

## 2017-01-18 RX ORDER — LIDOCAINE HYDROCHLORIDE 20 MG/ML
INJECTION, SOLUTION INFILTRATION; PERINEURAL PRN
Status: DISCONTINUED | OUTPATIENT
Start: 2017-01-18 | End: 2017-01-18

## 2017-01-18 RX ORDER — DEXAMETHASONE SODIUM PHOSPHATE 4 MG/ML
INJECTION, SOLUTION INTRA-ARTICULAR; INTRALESIONAL; INTRAMUSCULAR; INTRAVENOUS; SOFT TISSUE PRN
Status: DISCONTINUED | OUTPATIENT
Start: 2017-01-18 | End: 2017-01-18

## 2017-01-18 RX ORDER — ALBUTEROL SULFATE 0.83 MG/ML
2.5 SOLUTION RESPIRATORY (INHALATION) EVERY 4 HOURS PRN
Status: DISCONTINUED | OUTPATIENT
Start: 2017-01-18 | End: 2017-01-18 | Stop reason: HOSPADM

## 2017-01-18 RX ORDER — FENTANYL CITRATE 50 UG/ML
25-50 INJECTION, SOLUTION INTRAMUSCULAR; INTRAVENOUS EVERY 5 MIN PRN
Status: DISCONTINUED | OUTPATIENT
Start: 2017-01-18 | End: 2017-01-18 | Stop reason: HOSPADM

## 2017-01-18 RX ORDER — KETOROLAC TROMETHAMINE 30 MG/ML
INJECTION, SOLUTION INTRAMUSCULAR; INTRAVENOUS PRN
Status: DISCONTINUED | OUTPATIENT
Start: 2017-01-18 | End: 2017-01-18

## 2017-01-18 RX ORDER — NEOSTIGMINE METHYLSULFATE 1 MG/ML
VIAL (ML) INJECTION PRN
Status: DISCONTINUED | OUTPATIENT
Start: 2017-01-18 | End: 2017-01-18

## 2017-01-18 RX ORDER — EPHEDRINE SULFATE 50 MG/ML
INJECTION, SOLUTION INTRAMUSCULAR; INTRAVENOUS; SUBCUTANEOUS PRN
Status: DISCONTINUED | OUTPATIENT
Start: 2017-01-18 | End: 2017-01-18

## 2017-01-18 RX ORDER — ONDANSETRON 2 MG/ML
4 INJECTION INTRAMUSCULAR; INTRAVENOUS EVERY 30 MIN PRN
Status: DISCONTINUED | OUTPATIENT
Start: 2017-01-18 | End: 2017-01-18 | Stop reason: HOSPADM

## 2017-01-18 RX ORDER — HYDROCODONE BITARTRATE AND ACETAMINOPHEN 5; 325 MG/1; MG/1
1-2 TABLET ORAL
Status: COMPLETED | OUTPATIENT
Start: 2017-01-18 | End: 2017-01-18

## 2017-01-18 RX ORDER — PROPOFOL 10 MG/ML
INJECTION, EMULSION INTRAVENOUS PRN
Status: DISCONTINUED | OUTPATIENT
Start: 2017-01-18 | End: 2017-01-18

## 2017-01-18 RX ORDER — ONDANSETRON 4 MG/1
4 TABLET, ORALLY DISINTEGRATING ORAL EVERY 30 MIN PRN
Status: DISCONTINUED | OUTPATIENT
Start: 2017-01-18 | End: 2017-01-18 | Stop reason: HOSPADM

## 2017-01-18 RX ORDER — SODIUM CHLORIDE, SODIUM LACTATE, POTASSIUM CHLORIDE, CALCIUM CHLORIDE 600; 310; 30; 20 MG/100ML; MG/100ML; MG/100ML; MG/100ML
INJECTION, SOLUTION INTRAVENOUS CONTINUOUS PRN
Status: DISCONTINUED | OUTPATIENT
Start: 2017-01-18 | End: 2017-01-18

## 2017-01-18 RX ORDER — PROPOFOL 10 MG/ML
INJECTION, EMULSION INTRAVENOUS CONTINUOUS PRN
Status: DISCONTINUED | OUTPATIENT
Start: 2017-01-18 | End: 2017-01-18

## 2017-01-18 RX ORDER — NALOXONE HYDROCHLORIDE 0.4 MG/ML
.1-.4 INJECTION, SOLUTION INTRAMUSCULAR; INTRAVENOUS; SUBCUTANEOUS
Status: DISCONTINUED | OUTPATIENT
Start: 2017-01-18 | End: 2017-01-18 | Stop reason: HOSPADM

## 2017-01-18 RX ADMIN — PROPOFOL 200 MG: 10 INJECTION, EMULSION INTRAVENOUS at 14:27

## 2017-01-18 RX ADMIN — Medication 5 MG: at 14:47

## 2017-01-18 RX ADMIN — ROCURONIUM BROMIDE 30 MG: 10 INJECTION INTRAVENOUS at 14:27

## 2017-01-18 RX ADMIN — FENTANYL CITRATE 50 MCG: 50 INJECTION, SOLUTION INTRAMUSCULAR; INTRAVENOUS at 14:50

## 2017-01-18 RX ADMIN — PROPOFOL 200 MCG/KG/MIN: 10 INJECTION, EMULSION INTRAVENOUS at 14:27

## 2017-01-18 RX ADMIN — HYDROCODONE BITARTRATE AND ACETAMINOPHEN 1 TABLET: 5; 325 TABLET ORAL at 16:07

## 2017-01-18 RX ADMIN — MIDAZOLAM HYDROCHLORIDE 2 MG: 1 INJECTION, SOLUTION INTRAMUSCULAR; INTRAVENOUS at 14:26

## 2017-01-18 RX ADMIN — DEXAMETHASONE SODIUM PHOSPHATE 4 MG: 4 INJECTION, SOLUTION INTRAMUSCULAR; INTRAVENOUS at 14:49

## 2017-01-18 RX ADMIN — KETOROLAC TROMETHAMINE 15 MG: 30 INJECTION, SOLUTION INTRAMUSCULAR at 15:12

## 2017-01-18 RX ADMIN — SODIUM CHLORIDE, POTASSIUM CHLORIDE, SODIUM LACTATE AND CALCIUM CHLORIDE: 600; 310; 30; 20 INJECTION, SOLUTION INTRAVENOUS at 14:25

## 2017-01-18 RX ADMIN — ONDANSETRON 4 MG: 2 INJECTION INTRAMUSCULAR; INTRAVENOUS at 14:55

## 2017-01-18 RX ADMIN — LIDOCAINE HYDROCHLORIDE 80 MG: 20 INJECTION, SOLUTION INFILTRATION; PERINEURAL at 14:27

## 2017-01-18 RX ADMIN — FENTANYL CITRATE 50 MCG: 50 INJECTION, SOLUTION INTRAMUSCULAR; INTRAVENOUS at 14:26

## 2017-01-18 RX ADMIN — LIDOCAINE HYDROCHLORIDE 30 ML: 10 INJECTION, SOLUTION EPIDURAL; INFILTRATION; INTRACAUDAL; PERINEURAL at 15:17

## 2017-01-18 RX ADMIN — GLYCOPYRROLATE 0.6 MG: 0.2 INJECTION, SOLUTION INTRAMUSCULAR; INTRAVENOUS at 15:13

## 2017-01-18 RX ADMIN — CEFAZOLIN SODIUM 2 G: 2 INJECTION, SOLUTION INTRAVENOUS at 14:28

## 2017-01-18 RX ADMIN — NEOSTIGMINE METHYLSULFATE 3 MG: 1 INJECTION INTRAMUSCULAR; INTRAVENOUS; SUBCUTANEOUS at 15:13

## 2017-01-18 ASSESSMENT — LIFESTYLE VARIABLES: TOBACCO_USE: 1

## 2017-01-18 NOTE — ANESTHESIA PREPROCEDURE EVALUATION
Anesthesia Evaluation     . Pt has had prior anesthetic. Type: General      ROS/MED HX    ENT/Pulmonary:     (+)tobacco use, Current use , . .    Neurologic:       Cardiovascular:     (+) Dyslipidemia, ----. : . . . :. .       METS/Exercise Tolerance:     Hematologic:         Musculoskeletal:         GI/Hepatic:         Renal/Genitourinary:         Endo:         Psychiatric:         Infectious Disease:         Malignancy:         Other:                              Anesthesia Plan      History & Physical Review  History and physical reviewed and following examination; no interval change.    ASA Status:  2 .        Plan for General   PONV prophylaxis:  Ondansetron (or other 5HT-3) and Dexamethasone or Solumedrol  Propofol, Zofran, and decadron        Postoperative Care  Postoperative pain management:  IV analgesics and Oral pain medications.      Consents  Anesthetic plan, risks, benefits and alternatives discussed with:  Patient..                          .

## 2017-01-18 NOTE — IP AVS SNAPSHOT
MRN:[id number]                      After Visit Summary   1/18/2017    Arthur Jefferson    MRN: [id number]           Thank you!     Thank you for choosing [facility] for your care. Our goal is always to provide you with excellent care. Hearing back from our patients is one way we can continue to improve our services. Please take a few minutes to complete the written survey that you may receive in the mail after you visit with us. Thank you!        Patient Information     Date Of Birth          1955        About your hospital stay     You were admitted on:  January 18, 2017 You last received care in the:  [facility]    You were discharged on:  January 18, 2017       Who to Call     For medical emergencies, please call 911.  For non-urgent questions about your medical care, please call your primary care provider or clinic, [phone]  For questions related to your surgery, please call your surgery clinic        Attending Provider     Provider    Kwame Prado MD       Primary Care Provider Office Phone # Fax #    Naty Medeiros [phone]       [address] [address]        After Care Instructions     Diet Instructions       Resume pre-procedure diet            Discharge Instructions       Patient to follow up with appointment in ~2 weeks            Ice to affected area       Ice to operative site PRN            No lifting        No lifting over 10 lbs and no strenuous physical activity for 2 weeks            Shower        No shower for 24 hours post procedure. May shower POD 1                  Further instructions from your care team       Same Day Surgery Discharge Instructions for  Sedation and General Anesthesia       It's not unusual to feel dizzy, light-headed or faint for up to 24 hours after surgery or while taking pain medication.  If you have these symptoms: sit for a few minutes before  standing and have someone assist you when you get up to walk or use the bathroom.      You should rest and relax for the next 24 hours. We recommend you make arrangements to have an adult stay with you for at least 24 hours after your discharge.  Avoid hazardous and strenuous activity.      DO NOT DRIVE any vehicle or operate mechanical equipment for 24 hours following the end of your surgery.  Even though you may feel normal, your reactions may be affected by the medication you have received.      Do not drink alcoholic beverages for 24 hours following surgery.       Slowly progress to your regular diet as you feel able. It's not unusual to feel nauseated and/or vomit after receiving anesthesia.  If you develop these symptoms, drink clear liquids (apple juice, ginger ale, broth, 7-up, etc. ) until you feel better.  If your nausea and vomiting persists for 24 hours, please notify your surgeon.        All narcotic pain medications, along with inactivity and anesthesia, can cause constipation. Drinking plenty of liquids and increasing fiber intake will help.      For any questions of a medical nature, call your surgeon.      Do not make important decisions for 24 hours.      If you had general anesthesia, you may have a sore throat for a couple of days related to the breathing tube used during surgery.  You may use Cepacol lozenges to help with this discomfort.  If it worsens or if you develop a fever, contact your surgeon.       If you feel your pain is not well managed with the pain medications prescribed by your surgeon, please contact your surgeon's office to let them know so they can address your concerns.           **If you have questions or concerns about your procedure,   call Dr. Prado at [phone]**      DISCHARGE INSTRUCTIONS  Before your discharge from the hospital, your nurse will review the following instructions for your home care.  Please read this and ask your nurse or doctor any questions before  your discharge.  WOUND CARE  You may remove your dressing the morning after surgery or if drainage seeps through it. You should shower without the dressing on and replace it after bathing if the wound is still draining or if it is more comfortable to have the wound dressed.  You may use guaze and tape or bandaids depending on the size of the wound and your preference.  ACTIVITY  You may climb stairs.  You may exercise if you are comfortable.  You may drive without restrictions when you are not using prescription pain medication and are comfortable in the car.  You may return to work / school when you are comfortable without prescription pain medication.  BATHING  You may shower.  Do not soak the wound or swim until ten days after surgery and only if the incision has been dry for 2-3 days.  You may have steri-strips (looks like tape) on your incision.  They will fall off by themselves.  DIET  Return to the diet you were on before surgery.  CALL YOUR PHYSICIAN IF YOU HAVE  Chills or fever above 101  F.  Significant or malodorous drainage from the incision(s)   Significant bleeding  Pain not relieved by your pain medication or rest.  Increasing pain after the first 48 hours  HELPFUL HINTS  Occasionally with laparoscopic surgery, patients can experience pain into their shoulders or upper back due to gas being trapped in the abdomen.  This may take up to 48 hours to subside.  Prescription pain medications make most people constipated.  It is never a bad idea to take a mild laxative (Miralax / Milk of magnesia) while you are using your prescription medication.  You may take ibuprofen instead of or in addition to your prescription.  If you are taking the maximum amount of your prescription medication, you should not take any additional Tylenol.  FOLLOW-UP  You should call to schedule a follow-up visit in about 2 weeks ([phone]).          Pending Results     No orders found from 1/17/2017 to 1/19/2017.           "  Admission Information        Provider Department Dept Phone    2017 Kwame Prado MD [facility] [phone]      Your Vitals Were     Blood Pressure Temperature Respirations    113/77 mmHg 97.6  F (36.4  C) (Temporal) 18    Height Weight BMI (Body Mass Index)    1.676 m (5' 6\") 71.668 kg (158 lb) 25.51 kg/m2    Pulse Oximetry          100%        MyChart Information     [employer] lets you send messages to your doctor, view your test results, renew your prescriptions, schedule appointments and more. To sign up, go to [email or web address] . Click on \"Log in\" on the left side of the screen, which will take you to the Welcome page. Then click on \"Sign up Now\" on the right side of the page.     You will be asked to enter the access code listed below, as well as some personal information. Please follow the directions to create your username and password.     Your access code is: [email or web address]  Expires: 3/1/2017  9:22 AM     Your access code will  in 90 days. If you need help or a new code, please call your [facility] clinic or [phone].        Care EveryWhere ID     This is your Care EveryWhere ID. This could be used by other organizations to access your [facility] medical records  [id number]           Review of your medicines      START taking        Dose / Directions    HYDROcodone-acetaminophen 5-325 MG per tablet   Commonly known as:  NORCO   Used for:  Recurrent right inguinal hernia, Acute post-operative pain        Dose:  1-2 tablet   Take 1-2 tablets by mouth every 4 hours as needed for other (Moderate to Severe Pain)   Quantity:  25 tablet   Refills:  0            Where to get your medicines      Some of these will need a paper prescription and others can be bought over the counter. Ask your nurse if you have questions.     Bring a paper prescription for each of these medications    - HYDROcodone-acetaminophen 5-325 MG per tablet             Protect others around you: Learn how to safely " use, store and throw away your medicines at www.disposemymeds.org.             Medication List: This is a list of all your medications and when to take them. Check marks below indicate your daily home schedule. Keep this list as a reference.      Medications           Morning Afternoon Evening Bedtime As Needed    HYDROcodone-acetaminophen 5-325 MG per tablet   Commonly known as:  NORCO   Take 1-2 tablets by mouth every 4 hours as needed for other (Moderate to Severe Pain)   Last time this was given:  1 tablet on 1/18/2017  4:07 PM

## 2017-01-18 NOTE — ANESTHESIA POSTPROCEDURE EVALUATION
Patient: Arthur Jefferson    LAPAROSCOPIC HERNIORRHAPHY INGUINAL (Right Groin)  Additional InformationProcedure(s):  LAPAROSCOPIC RIGHT INGUINAL HERNIA REPAIR WITH MESH - Wound Class: I-Clean    Diagnosis:RECURRENT RIGHT INGUINAL HERNIA  Diagnosis Additional Information: No value filed.    Anesthesia Type:  General    Note:  Anesthesia Post Evaluation    Patient location during evaluation: PACU  Patient participation: Able to fully participate in evaluation  Level of consciousness: awake  Pain management: adequate  Airway patency: patent  Cardiovascular status: acceptable  Respiratory status: acceptable  Hydration status: acceptable  PONV: controlled     Anesthetic complications: None          Last vitals:  Filed Vitals:    01/18/17 1310   BP: 143/80   Temp: 36.3  C (97.3  F)   SpO2: 97%       Electronically Signed By: Yusef Felton MD  January 18, 2017  3:33 PM

## 2017-01-18 NOTE — DISCHARGE INSTRUCTIONS
Same Day Surgery Discharge Instructions for  Sedation and General Anesthesia       It's not unusual to feel dizzy, light-headed or faint for up to 24 hours after surgery or while taking pain medication.  If you have these symptoms: sit for a few minutes before standing and have someone assist you when you get up to walk or use the bathroom.      You should rest and relax for the next 24 hours. We recommend you make arrangements to have an adult stay with you for at least 24 hours after your discharge.  Avoid hazardous and strenuous activity.      DO NOT DRIVE any vehicle or operate mechanical equipment for 24 hours following the end of your surgery.  Even though you may feel normal, your reactions may be affected by the medication you have received.      Do not drink alcoholic beverages for 24 hours following surgery.       Slowly progress to your regular diet as you feel able. It's not unusual to feel nauseated and/or vomit after receiving anesthesia.  If you develop these symptoms, drink clear liquids (apple juice, ginger ale, broth, 7-up, etc. ) until you feel better.  If your nausea and vomiting persists for 24 hours, please notify your surgeon.        All narcotic pain medications, along with inactivity and anesthesia, can cause constipation. Drinking plenty of liquids and increasing fiber intake will help.      For any questions of a medical nature, call your surgeon.      Do not make important decisions for 24 hours.      If you had general anesthesia, you may have a sore throat for a couple of days related to the breathing tube used during surgery.  You may use Cepacol lozenges to help with this discomfort.  If it worsens or if you develop a fever, contact your surgeon.       If you feel your pain is not well managed with the pain medications prescribed by your surgeon, please contact your surgeon's office to let them know so they can address your concerns.           **If you have questions or concerns about  your procedure,   call Dr. Prado at [phone]**      DISCHARGE INSTRUCTIONS  Before your discharge from the hospital, your nurse will review the following instructions for your home care.  Please read this and ask your nurse or doctor any questions before your discharge.  WOUND CARE  You may remove your dressing the morning after surgery or if drainage seeps through it. You should shower without the dressing on and replace it after bathing if the wound is still draining or if it is more comfortable to have the wound dressed.  You may use guaze and tape or bandaids depending on the size of the wound and your preference.  ACTIVITY  You may climb stairs.  You may exercise if you are comfortable.  You may drive without restrictions when you are not using prescription pain medication and are comfortable in the car.  You may return to work / school when you are comfortable without prescription pain medication.  BATHING  You may shower.  Do not soak the wound or swim until ten days after surgery and only if the incision has been dry for 2-3 days.  You may have steri-strips (looks like tape) on your incision.  They will fall off by themselves.  DIET  Return to the diet you were on before surgery.  CALL YOUR PHYSICIAN IF YOU HAVE  Chills or fever above 101  F.  Significant or malodorous drainage from the incision(s)   Significant bleeding  Pain not relieved by your pain medication or rest.  Increasing pain after the first 48 hours  HELPFUL HINTS  Occasionally with laparoscopic surgery, patients can experience pain into their shoulders or upper back due to gas being trapped in the abdomen.  This may take up to 48 hours to subside.  Prescription pain medications make most people constipated.  It is never a bad idea to take a mild laxative (Miralax / Milk of magnesia) while you are using your prescription medication.  You may take ibuprofen instead of or in addition to your prescription.  If you are taking the maximum  amount of your prescription medication, you should not take any additional Tylenol.  FOLLOW-UP  You should call to schedule a follow-up visit in about 2 weeks ([phone]).

## 2017-01-18 NOTE — OP NOTE
General Surgery Operative Note    PREOPERATIVE DIAGNOSIS:  Symptomatic recurrent right inguinal hernia    POSTOPERATIVE DIAGNOSIS:  Symptomatic recurrent right inguinal hernia    PROCEDURE:  Laparoscopic repair of recurrent right inguinal hernia repair with mesh    ANESTHESIA:  General.    SURGEON:  Kwame Prado    ASSISTANT:  Kodi Jackson PA-C    ESTIMATED BLOOD LOSS:  5 cc    INTRAOPERATIVE FINDINGS:  Reoccurrence on the very medial border. No indirect hernia    OPERATIVE INDICATIONS: Mr. Jefferson has a symptomatic recurrent right inguinal hernia. He has had an open repair in the past. Options were discussed and it was elected to proceed with laparoscopic repair. Potential risks of bleeding, infection, neurovascular injury to the vas deferens or testicle, recurrent hernia, chronic pain were all reviewed in detail and he wished to proceed.     DESCRIPTION OF PROCEDURE: The patient was taken to the operating suite and uneventfully endotracheally intubated. The abdomen and groin were prepped and draped in the usual sterile fashion. Surgeon initiated timeout was performed verifying the correct patient, procedure, site, and surgeon. All in the room were in agreement. A mixture of lidocaine and marcaine was injected in the infra umbilical area.  A curvilinear incision was made at the underside of the umbilicus. Bovie cautery was used to get through the subcutaneous tissue. The anterior rectus sheath was encountered and sharply incised. The rectus muscle was retracted laterally and the dissecting balloon was passed along the posterior rectus sheath toward the pubic bone. The balloon was filled with air with the hand pump under direct visualization. The preperitoneal space was dissected nicely and the balloon held in place for 30 seconds for hemostasis. The dissecting balloon was then removed and our working balloon was placed and positioned. Local was injected and Two 5 mm trocars were then placed along the  lower midline under direct laparoscopic visualization. We began our dissection on the right side. Using combination of sharp and blunt dissection, a plane was created behind the abdominal wall and the underlying peritoneum. This was done in a blunt and atraumatic fashion. The inferior epigastric vessels were visualized running along the underside of the abdominal wall.  The epigastric vessels were followed down until we were able to identify the internal ring. The spermatic cord was then meticulously dissected preserving all of the nerve and blood vessels. A  moderate sized recurrent medial direct hernia was seen.  Hernia was reduced without difficulty. Coopers ligament was then cleared without significant bleeding. The dissection was continued until we had an excellent space in the preperitoneal area.   A piece of progrip was then placed within this space. The mesh had great coverage over the medial defect. Due to the medial nature I tacked the mesh with absorbable tacks to assure proper placement.The mesh was held in excellent position under direct visualization until the insufflation was completely out of the preperitoneal space. All trocars were removed under direct visualization. The anterior fascia was closed with an 0 Vicryl in the figure of 8 fashion. Marcaine was once again injected to the mathew-wound area. The incisions were completely dry without any bleeding. The skin was closed with a 4.0 Monocryl in a subcuticular fashion.  Steri-Strips were applied. All Needle and sponge counts were correct.  A debriefing was performed verifying the correct procedure, sponge/instrument count, specimen identification, and blood loss. The patient tolerated the procedure well and was taken to the recovery room in stable condition. A physician's assistant was necessary for retraction and closure    Kwame Prado M.D.  Please CC to referring provider and PCP

## 2017-01-18 NOTE — IP AVS SNAPSHOT
[facility]    [address]    [place] [address]    Phone:  [phone]    Fax:  [phone]                                       After Visit Summary   1/18/2017    Arthur Jefferson    MRN: [id number]           After Visit Summary Signature Page     I have received my discharge instructions, and my questions have been answered. I have discussed any challenges I see with this plan with the nurse or doctor.    ..........................................................................................................................................  Patient/Patient Representative Signature      ..........................................................................................................................................  Patient Representative Print Name and Relationship to Patient    ..................................................               ................................................  Date                                            Time    ..........................................................................................................................................  Reviewed by Signature/Title    ...................................................              ..............................................  Date                                                            Time

## 2017-01-18 NOTE — ANESTHESIA CARE TRANSFER NOTE
Patient: Arthur Jefferson    LAPAROSCOPIC HERNIORRHAPHY INGUINAL (Right Groin)  Additional InformationProcedure(s):  LAPAROSCOPIC RIGHT INGUINAL HERNIA REPAIR WITH MESH - Wound Class: I-Clean    Diagnosis: RECURRENT RIGHT INGUINAL HERNIA  Diagnosis Additional Information: No value filed.    Anesthesia Type:   General     Note:  Airway :Face Mask  Patient transferred to:PACU        Vitals: (Last set prior to Anesthesia Care Transfer)              Electronically Signed By: MARILUZ Heredia CRNA  January 18, 2017  3:31 PM

## 2017-01-18 NOTE — BRIEF OP NOTE
[facility] Brief Operative Note    Pre-operative diagnosis: RECURRENT RIGHT INGUINAL HERNIA   Post-operative diagnosis Recurrent Right Inguinal Hernia     Procedure: Procedure(s):  LAPAROSCOPIC RIGHT INGUINAL HERNIA REPAIR WITH MESH - Wound Class: I-Clean   Surgeon(s): Surgeon(s) and Role:     * Kwame Prado MD - Primary     * Kodi Jackson PA-C   Estimated blood loss: 5 mL    Specimens: * No specimens in log *   Findings: Recurrence very medial.    ProGrip 15x10 cm mesh used with Absorbatak to keep medial coverage  See Operative Report for full details

## 2017-01-19 ENCOUNTER — TELEPHONE (OUTPATIENT)
Dept: FAMILY MEDICINE | Facility: CLINIC | Age: 62
End: 2017-01-19

## 2017-01-19 ENCOUNTER — HOSPITAL ENCOUNTER (EMERGENCY)
Facility: CLINIC | Age: 62
Discharge: HOME | End: 2017-01-19
Attending: EMERGENCY MEDICINE | Admitting: EMERGENCY MEDICINE
Payer: COMMERCIAL

## 2017-01-19 VITALS
BODY MASS INDEX: 25.39 KG/M2 | OXYGEN SATURATION: 98 % | WEIGHT: 158 LBS | DIASTOLIC BLOOD PRESSURE: 89 MMHG | HEIGHT: 66 IN | RESPIRATION RATE: 16 BRPM | SYSTOLIC BLOOD PRESSURE: 151 MMHG | TEMPERATURE: 98.2 F

## 2017-01-19 DIAGNOSIS — N99.89 POSTOPERATIVE URINARY RETENTION: Primary | ICD-10-CM

## 2017-01-19 DIAGNOSIS — R33.8 POSTOPERATIVE URINARY RETENTION: Primary | ICD-10-CM

## 2017-01-19 DIAGNOSIS — R33.9 URINARY RETENTION WITH INCOMPLETE BLADDER EMPTYING: ICD-10-CM

## 2017-01-19 LAB
ALBUMIN UR-MCNC: NEGATIVE MG/DL
APPEARANCE UR: CLEAR
BILIRUB UR QL STRIP: NEGATIVE
COLOR UR AUTO: NORMAL
GLUCOSE UR STRIP-MCNC: NEGATIVE MG/DL
HGB UR QL STRIP: NEGATIVE
KETONES UR STRIP-MCNC: NEGATIVE MG/DL
LEUKOCYTE ESTERASE UR QL STRIP: NEGATIVE
NITRATE UR QL: NEGATIVE
PH UR STRIP: 6.5 PH (ref 5–7)
SP GR UR STRIP: 1.01 (ref 1–1.03)
URN SPEC COLLECT METH UR: NORMAL
UROBILINOGEN UR STRIP-MCNC: NORMAL MG/DL (ref 0–2)

## 2017-01-19 PROCEDURE — 51702 INSERT TEMP BLADDER CATH: CPT

## 2017-01-19 PROCEDURE — 81003 URINALYSIS AUTO W/O SCOPE: CPT | Performed by: EMERGENCY MEDICINE

## 2017-01-19 PROCEDURE — 99283 EMERGENCY DEPT VISIT LOW MDM: CPT

## 2017-01-19 ASSESSMENT — ENCOUNTER SYMPTOMS
FEVER: 0
DIFFICULTY URINATING: 1

## 2017-01-19 NOTE — TELEPHONE ENCOUNTER
No.  He needs to see Urology for management of catheter and post-op urinary retention!  Naty Medeiros MD

## 2017-01-19 NOTE — DISCHARGE INSTRUCTIONS
Urinary Retention (Male)  Urinary retention is the medical term for difficulty or inability to pass urine, even though your bladder is full.  Causes  The most common cause of urinary retention in men is the bladder outlet being blocked. This can be due to an enlarged prostate gland or a bladder infection. Certain medicines can also cause this problem. This condition is more likely to occur as men get older.    This condition is treated by insertion of a catheter into the bladder to drain the urine. This provides immediate relief. The catheter may need to remain in place for a few days to prevent a recurrence. The catheter has a balloon on the tip which was inflated after insertion. This prevents the catheter from falling out.  Symptoms  Common symptoms of urinary retention include:    Pain (not experienced by everyone)    Frequent urination    Feeling that the bladder is still full after urinating    Incontinence (not being able to control the release of urine)    Swollen abdomen  Treatment  This condition is treated by inserting a tube (catheter) into the bladder to drain the urine. This provides immediate relief. The catheter may need to stay in place for a few days. The catheter has a balloon on the tip, which is inflated after insertion. This prevents the catheter from falling out.  Home care    If you were given antibiotics, take them until they are used up, or your healthcare provider tells you to stop. It is important to finish the antibiotics even though you feel better. This is to make sure your infection has cleared.    If a catheter was left in place, it is important to keep bacteria from getting into the collection bag. Do not disconnect the catheter from the collection bag.    Use a leg band to secure the drainage tube, so it does not pull on the catheter. Drain the collection bag when it becomes full using the drain spout at the bottom of the bag.    Do not pull on or try to remove your catheter.  This will injure your urethra. The catheter must be removed by a healthcare provider.  Follow-up care  Follow up with your healthcare provider, or as advised.  If a catheter was left in place, it can usually be removed within 3 to 7 days. Some conditions require the catheter to stay in longer. Your healthcare provider will tell you when to return to have the catheter removed.  When to seek medical advice  Call your healthcare provider right away if any of these occur:    Fever of 100.4 F (38 C) or higher, or as directed by your healthcare provider    Bladder or lower-abdominal pain or fullness    Abdominal swelling, nausea, vomiting, or back pain    Blood or urine leakage around the catheter    Bloody urine coming from the catheter (if a new symptom)    Weakness, dizziness, or fainting    Confusion or change in usual level of alertness    If a catheter was left in place, return if:    Catheter falls out    Catheter stops draining for 6 hours    7423-4501 [employer]. [address] [place] [address]. All rights reserved. This information is not intended as a substitute for professional medical care. Always follow your healthcare professional's instructions.          Aj Catheter Care    A Aj catheter is a rubber tube that is placed through the urethra (opening where urine comes out) and into the bladder. This helps drain urine from the bladder. There is a small balloon on the end of the tube that is inflated after insertion. This keeps the catheter from sliding out of the bladder.  A Aj catheter is used to treat urinary retention (unable to pass urine). It is also used when there is incontinence (loss of bladder control).  Home care    Finish taking any prescribed antibiotic even if you are feeling better before then.    It is important to keep bacteria from getting into the collection bag. Do not disconnect the catheter from the collection bag.    Use a leg band to secure the drainage  tube, so it does not pull on the catheter. Drain the collection bag when it becomes full using the drain spout at the bottom of the bag.    Do not try to pull or remove your catheter. This will injure your urethra. It must be removed by a doctor or nurse.  Follow-up care  Follow up with your doctor as advised for repeat urine testing and catheter removal or replacement.  When to seek medical care  Get prompt medical attention if any of the following occur:    Fever of 100.4 F (38 C) or higher, or as directed by your health care provider    Bladder pain or fullness    Abdominal swelling, nausea or vomiting or back pain    Blood or urine leakage around the catheter    Bloody urine coming from the catheter (if a new symptom)    Catheter falls out    Catheter stops draining for 6 hours    Weakness, dizziness or fainting    0219-3470 [employer]. [address] [place] [address], [place] [address]. All rights reserved. This information is not intended as a substitute for professional medical care. Always follow your healthcare professional's instructions.

## 2017-01-19 NOTE — ED PROVIDER NOTES
"  History     Chief Complaint:  Urinary Retention      HPI   Arthur Jefferson is a 62 year old male with history of an enlarged prostate status post bilateral inguinal herniorrhaphy yesterday (1/18) who presents with urinary retention.  The patient states his surgery went otherwise well without complication yesterday.  He reports for the last 8 hours he has had the urge to urinate but has been unable to which has prompts him to present to the ED.  He notes that his brother underwent a herniorrhaphy a few weeks ago and also needed to have a catheter placed due to urinary retention.  The patient denies any fever other complaints.      Allergies:  NKDA      Medications:    Norco     Past Medical History:    HLD  BPH  Renal Disease     Past Surgical History:    Bilateral Inguinal Herniorrhaphy      Family History:  History reviewed.  No significant family history.     Social History:  Relationship status:   The patient is a light tobacco smoker.  The patient is positive for weekly alcohol use.   The patient presents with his wife.     Review of Systems   Constitutional: Negative for fever.   Genitourinary: Positive for difficulty urinating.   All other systems reviewed and are negative.      Physical Exam   First Vitals:  BP: 151/89 mmHg  Heart Rate: 83  Temp: 98.2  F (36.8  C)  Resp: 16  Height: 167.6 cm (5' 6\")  Weight: 71.668 kg (158 lb)  SpO2: 98 %      Physical Exam  General: Patient is alert and uncomfortable appearing.  HEENT: Head atraumatic    Eyes: pupils equal and reactive. Conjunctiva clear   Nares: patent   Oropharynx: no lesions, uvula midline, no palatal draping, normal voice, no trismus  Neck: Supple without lymphadenopathy, no meningismus  Chest: Heart regular rate and rhythm.   Lungs: Equal clear to auscultation with no wheeze or rales  Abdomen: Soft, suprapubic distention and tenderness to palpation, surgical wound clean,dry and intact with some ecchymosis but no swelling, drainage or " fluctance and minimal tenderness, normal bowel sounds  Back: No costovertebral angle tenderness, no midline C, T or L spine tenderness  Neuro: Grossly nonfocal, normal speech, strength equal bilaterally, CN 2-12 intact  Extremities: No deformities, equal radial and DP pulses. No clubbing, cyanosis.  No edema  Skin: Warm and dry with no rash.       Emergency Department Course     Laboratory:  UA: Clear, light yellow urine; o/w WNL    ED Course:  Nursing notes and past medical history reviewed.   I performed a physical examination of the patient as documented above.  I explained the plan with the patient who consents to this.   The patient underwent the workup as described above.   The patient had a catheter placed with good relief.   Findings and plan explained to the Patient. Patient discharged home with instructions regarding supportive care, medications, and reasons to return. The importance of close follow-up was reviewed.     Impression & Plan      Medical Decision Making:  Arthur Jefferson is a 62 year old male who presents for evaluation of abdominal pain and decreased urinary output.  I considered a broad differential including diverticulitis, aneurysm, urinary retention, ureterolithiasis, UTI, pyelonephritis, neurologic causes (MS, cauda equina,etc), colitis, etc.  The history and exam are consistent with acute urinary retention and this is confirmed after casper catheter placement.  A urinalysis was obtained and was negative for infection.  Will send home with catheter and have patient follow up with urology in 3-5 days.  The cause of the acute urinary retention is most likely from procedural anesthesia though considered that this may be caused by opiate medication, other medications, constipation, prostate issues, bladder or urethral tumor, ureterolithiasis, etc.  Patients surgical wounds clean dry and intact, with some ecchymosis surrounding but no swelling, fluctuance or drainage.  Patient is stable for  discharge home.      Diagnosis:    ICD-10-CM   1. Urinary retention with incomplete bladder emptying R33.9       Disposition:   Discharge to home with outpatient Urology follow up.         IDemetris, am serving as a scribe on 1/19/2017 at 12:30 AM to personally document services performed by Teresita Gomez MD, based on my observations and the provider's statements to me.        Teresita Gomez MD  01/19/17 0328

## 2017-01-19 NOTE — ED AVS SNAPSHOT
Emergency Department    [address] [facility] [address]    Phone:  [phone]    Fax:  [phone]                                       Arthur Jefferson   MRN: [id number]    Department:   Emergency Department   Date of Visit:  1/19/2017           Patient Information     Date Of Birth          1955        Your diagnoses for this visit were:     Urinary retention with incomplete bladder emptying        You were seen by Teresita Gomez MD.      Follow-up Information     Follow up with Naty Medeiros MD In 2 days.    Specialty:  Family Practice    Contact information:    [facility]  [address]  [facility] [address] [phone]          Schedule an appointment as soon as possible for a visit with Jeronimo Hooper MD.    Specialty:  Urology    Contact information:    [facility]  [address] [address] [phone]          Follow up with  Emergency Department.    Specialty:  EMERGENCY MEDICINE    Why:  if catheter not draining    Contact information:    [address] [facility] [address] [phone]        Discharge Instructions         Urinary Retention (Male)  Urinary retention is the medical term for difficulty or inability to pass urine, even though your bladder is full.  Causes  The most common cause of urinary retention in men is the bladder outlet being blocked. This can be due to an enlarged prostate gland or a bladder infection. Certain medicines can also cause this problem. This condition is more likely to occur as men get older.    This condition is treated by insertion of a catheter into the bladder to drain the urine. This provides immediate relief. The catheter may need to remain in place for a few days to prevent a recurrence. The catheter has a balloon on the tip which was inflated after insertion. This prevents the catheter from falling out.  Symptoms  Common symptoms of urinary retention  include:    Pain (not experienced by everyone)    Frequent urination    Feeling that the bladder is still full after urinating    Incontinence (not being able to control the release of urine)    Swollen abdomen  Treatment  This condition is treated by inserting a tube (catheter) into the bladder to drain the urine. This provides immediate relief. The catheter may need to stay in place for a few days. The catheter has a balloon on the tip, which is inflated after insertion. This prevents the catheter from falling out.  Home care    If you were given antibiotics, take them until they are used up, or your healthcare provider tells you to stop. It is important to finish the antibiotics even though you feel better. This is to make sure your infection has cleared.    If a catheter was left in place, it is important to keep bacteria from getting into the collection bag. Do not disconnect the catheter from the collection bag.    Use a leg band to secure the drainage tube, so it does not pull on the catheter. Drain the collection bag when it becomes full using the drain spout at the bottom of the bag.    Do not pull on or try to remove your catheter. This will injure your urethra. The catheter must be removed by a healthcare provider.  Follow-up care  Follow up with your healthcare provider, or as advised.  If a catheter was left in place, it can usually be removed within 3 to 7 days. Some conditions require the catheter to stay in longer. Your healthcare provider will tell you when to return to have the catheter removed.  When to seek medical advice  Call your healthcare provider right away if any of these occur:    Fever of 100.4 F (38 C) or higher, or as directed by your healthcare provider    Bladder or lower-abdominal pain or fullness    Abdominal swelling, nausea, vomiting, or back pain    Blood or urine leakage around the catheter    Bloody urine coming from the catheter (if a new symptom)    Weakness, dizziness, or  fainting    Confusion or change in usual level of alertness    If a catheter was left in place, return if:    Catheter falls out    Catheter stops draining for 6 hours    1530-3812 [employer]. [address] [place] [address], [place] [address]. All rights reserved. This information is not intended as a substitute for professional medical care. Always follow your healthcare professional's instructions.          Aj Catheter Care    A Aj catheter is a rubber tube that is placed through the urethra (opening where urine comes out) and into the bladder. This helps drain urine from the bladder. There is a small balloon on the end of the tube that is inflated after insertion. This keeps the catheter from sliding out of the bladder.  A Aj catheter is used to treat urinary retention (unable to pass urine). It is also used when there is incontinence (loss of bladder control).  Home care    Finish taking any prescribed antibiotic even if you are feeling better before then.    It is important to keep bacteria from getting into the collection bag. Do not disconnect the catheter from the collection bag.    Use a leg band to secure the drainage tube, so it does not pull on the catheter. Drain the collection bag when it becomes full using the drain spout at the bottom of the bag.    Do not try to pull or remove your catheter. This will injure your urethra. It must be removed by a doctor or nurse.  Follow-up care  Follow up with your doctor as advised for repeat urine testing and catheter removal or replacement.  When to seek medical care  Get prompt medical attention if any of the following occur:    Fever of 100.4 F (38 C) or higher, or as directed by your health care provider    Bladder pain or fullness    Abdominal swelling, nausea or vomiting or back pain    Blood or urine leakage around the catheter    Bloody urine coming from the catheter (if a new symptom)    Catheter falls out    Catheter stops draining for 6  hours    Weakness, dizziness or fainting    2871-8096 [employer]. [address] [place] [address], [place] [address]. All rights reserved. This information is not intended as a substitute for professional medical care. Always follow your healthcare professional's instructions.          24 Hour Appointment Hotline       To make an appointment at any [facility], call [phone] ([phone]). If you don't have a family doctor or clinic, we will help you find one. [facility] are conveniently located to serve the needs of you and your family.             Review of your medicines      Our records show that you are taking the medicines listed below. If these are incorrect, please call your family doctor or clinic.        Dose / Directions Last dose taken    HYDROcodone-acetaminophen 5-325 MG per tablet   Commonly known as:  NORCO   Dose:  1-2 tablet   Quantity:  25 tablet        Take 1-2 tablets by mouth every 4 hours as needed for other (Moderate to Severe Pain)   Refills:  0                Procedures and tests performed during your visit     UA reflex to Microscopic and Culture      Orders Needing Specimen Collection     None      Pending Results     No orders found from 1/18/2017 to 1/20/2017.            Pending Culture Results     No orders found from 1/18/2017 to 1/20/2017.       Test Results from your hospital stay           1/19/2017  1:04 AM - Interface, Kid Bunch Results      Component Results     Component Value Ref Range & Units Status    Color Urine Light Yellow  Final    Appearance Urine Clear  Final    Glucose Urine Negative NEG mg/dL Final    Bilirubin Urine Negative NEG Final    Ketones Urine Negative NEG mg/dL Final    Specific Gravity Urine 1.008 1.003 - 1.035 Final    Blood Urine Negative NEG Final    pH Urine 6.5 5.0 - 7.0 pH Final    Protein Albumin Urine Negative NEG mg/dL Final    Urobilinogen mg/dL Normal 0.0 - 2.0 mg/dL Final    Nitrite Urine Negative NEG Final    Leukocyte  Esterase Urine Negative NEG Final    Source Catheterized Urine  Final                Clinical Quality Measure: Blood Pressure Screening     Your blood pressure was checked while you were in the emergency department today. The last reading we obtained was  BP: 151/89 mmHg . Please read the guidelines below about what these numbers mean and what you should do about them.  If your systolic blood pressure (the top number) is less than 120 and your diastolic blood pressure (the bottom number) is less than 80, then your blood pressure is normal. There is nothing more that you need to do about it.  If your systolic blood pressure (the top number) is 120-139 or your diastolic blood pressure (the bottom number) is 80-89, your blood pressure may be higher than it should be. You should have your blood pressure rechecked within a year by a primary care provider.  If your systolic blood pressure (the top number) is 140 or greater or your diastolic blood pressure (the bottom number) is 90 or greater, you may have high blood pressure. High blood pressure is treatable, but if left untreated over time it can put you at risk for heart attack, stroke, or kidney failure. You should have your blood pressure rechecked by a primary care provider within the next 4 weeks.  If your provider in the emergency department today gave you specific instructions to follow-up with your doctor or provider even sooner than that, you should follow that instruction and not wait for up to 4 weeks for your follow-up visit.        Thank you for choosing [facility]       Thank you for choosing [facility] for your care. Our goal is always to provide you with excellent care. Hearing back from our patients is one way we can continue to improve our services. Please take a few minutes to complete the written survey that you may receive in the mail after you visit with us. Thank you!        [email or web address] Information     [email or web address] lets you send messages to your doctor, view  "your test results, renew your prescriptions, schedule appointments and more. To sign up, go to [email or web address] . Click on \"Log in\" on the left side of the screen, which will take you to the Welcome page. Then click on \"Sign up Now\" on the right side of the page.     You will be asked to enter the access code listed below, as well as some personal information. Please follow the directions to create your username and password.     Your access code is: [email or web address]  Expires: 3/1/2017  9:22 AM     Your access code will  in 90 days. If you need help or a new code, please call your [facility] clinic or [phone].        Care EveryWhere ID     This is your Care EveryWhere ID. This could be used by other organizations to access your [facility] medical records  [id number]        After Visit Summary       This is your record. Keep this with you and show to your community pharmacist(s) and doctor(s) at your next visit.                  "

## 2017-01-19 NOTE — TELEPHONE ENCOUNTER
Kyle states he had a hernia repair done on 1/18/17  Had to go to the ER yesterday and had a urinary catheter inserted as he could not void  States they told him he could go to his primary clinic to have that pulled on Friday.  States that urology does not have any openings on Friday.  Dr Medeiros please advise - this is not something we normally do in clinic  Mariely Ortiz RN

## 2017-01-19 NOTE — ED AVS SNAPSHOT
Emergency Department    [address] [facility] [address]    Phone:  [phone]    Fax:  [phone]                                       Arthur Jefferson   MRN: [id number]    Department:   Emergency Department   Date of Visit:  1/19/2017           After Visit Summary Signature Page     I have received my discharge instructions, and my questions have been answered. I have discussed any challenges I see with this plan with the nurse or doctor.    ..........................................................................................................................................  Patient/Patient Representative Signature      ..........................................................................................................................................  Patient Representative Print Name and Relationship to Patient    ..................................................               ................................................  Date                                            Time    ..........................................................................................................................................  Reviewed by Signature/Title    ...................................................              ..............................................  Date                                                            Time

## 2017-03-01 ENCOUNTER — TRANSFERRED RECORDS (OUTPATIENT)
Dept: HEALTH INFORMATION MANAGEMENT | Facility: CLINIC | Age: 62
End: 2017-03-01

## 2017-03-03 PROBLEM — C61 PROSTATE CANCER (H): Status: ACTIVE | Noted: 2017-03-03

## 2017-06-15 ENCOUNTER — TELEPHONE (OUTPATIENT)
Dept: FAMILY MEDICINE | Facility: CLINIC | Age: 62
End: 2017-06-15

## 2017-06-15 NOTE — TELEPHONE ENCOUNTER
Patient thinks he has tick bite rash under axilla.  He declines triage x 2.  He has appointment for tomorrow morning.    Katherin Romero RN

## 2017-06-16 ENCOUNTER — OFFICE VISIT (OUTPATIENT)
Dept: FAMILY MEDICINE | Facility: CLINIC | Age: 62
End: 2017-06-16
Payer: COMMERCIAL

## 2017-06-16 VITALS
WEIGHT: 157.5 LBS | HEART RATE: 69 BPM | DIASTOLIC BLOOD PRESSURE: 70 MMHG | TEMPERATURE: 99.4 F | HEIGHT: 66 IN | RESPIRATION RATE: 16 BRPM | BODY MASS INDEX: 25.31 KG/M2 | OXYGEN SATURATION: 98 % | SYSTOLIC BLOOD PRESSURE: 120 MMHG

## 2017-06-16 DIAGNOSIS — R21 RASH AND OTHER NONSPECIFIC SKIN ERUPTION: Primary | ICD-10-CM

## 2017-06-16 PROCEDURE — 99213 OFFICE O/P EST LOW 20 MIN: CPT | Performed by: FAMILY MEDICINE

## 2017-06-16 RX ORDER — DOXYCYCLINE HYCLATE 100 MG
100 TABLET ORAL 2 TIMES DAILY
Qty: 28 TABLET | Refills: 0 | Status: SHIPPED | OUTPATIENT
Start: 2017-06-16 | End: 2017-07-17

## 2017-06-16 NOTE — PATIENT INSTRUCTIONS
Rash under right arm could be lymes but is non specific and could be local reaction from a bite versus cellulitis form skin bacteria   Doxycyline twice a day for 2 weeks   Eat a probiotic like acidophilus daily while on antibiotic   See dermatology if no answers  If gets worse has high fever or increased redness welling warmth pain go to the ER   Follow up Primary Dr Medeiros if not better   Can make no recommendations on xander silver

## 2017-06-16 NOTE — PROGRESS NOTES
SUBJECTIVE:                                                    Arthur Jefferson is a 62 year old male who presents to clinic today for the following health issues:    Rash      Duration: noticed on Tuesday 3 days ago.     Didn't note a tick bite     Thinks was larvae and not seen.         Description  Location: bullseye rash under right arm ( arm pit)   Itching: no    Accompanying signs and symptoms: patient was cutting a tree and thought he hit himself. And thought ran into a stick and had redness.  Patient states that noticed on Tuesday developed into a bullseye rash    History (similar episodes/previous evaluation): history of prostate cancer ; was on luperone     don't have time to do blood tests    As due for surgery         Patient will be having prostate surgery soon     Precipitating or alleviating factors:  New exposures:  None  Recent travel: no      Therapies tried and outcome: none    Has fatigue but also has had that since had been on Lupron     One mor month then to get     Used magnifying glass , no niraj seen    Feels a bit itchy      Takes xander silver sprayed in mouth as cures lyme's. Sure this rash is lyme's     Problem list and histories reviewed & adjusted, as indicated.  Additional history: as documented    Patient Active Problem List   Diagnosis     Mixed hyperlipidemia     Elevated fasting glucose     Prostate cancer (H)     Past Surgical History:   Procedure Laterality Date     HERNIORRHAPHY INGUINAL      bilateral, one open, one laparoscopic     LAPAROSCOPIC HERNIORRHAPHY INGUINAL Right 1/18/2017    Procedure: LAPAROSCOPIC HERNIORRHAPHY INGUINAL;  Surgeon: Kwame Prado MD;  [facility]: [facility]       Social History   Substance Use Topics     Smoking status: Light Tobacco Smoker     Last attempt to quit: 11/30/2008     Smokeless tobacco: Not on file     Alcohol use 1.2 oz/week     2 Standard drinks or equivalent per week     Family History   Problem Relation Age of Onset     CANCER  Mother      liver     Prostate Cancer Father      Asthma No family hx of      DIABETES No family hx of      Hypertension No family hx of      CEREBROVASCULAR DISEASE No family hx of      Breast Cancer No family hx of      Cancer - colorectal No family hx of      Alcohol/Drug No family hx of      Allergies No family hx of      Alzheimer Disease No family hx of      Arthritis No family hx of      Blood Disease No family hx of      Cardiovascular No family hx of      Depression No family hx of      Eye Disorder No family hx of      GASTROINTESTINAL DISEASE No family hx of      HEART DISEASE No family hx of      Lipids No family hx of      Musculoskeletal Disorder No family hx of      Neurologic Disorder No family hx of      Obesity No family hx of      Psychotic Disorder No family hx of      Respiratory No family hx of      Thyroid Disease No family hx of          Current Outpatient Prescriptions   Medication Sig Dispense Refill     doxycycline (VIBRA-TABS) 100 MG tablet Take 1 tablet (100 mg) by mouth 2 times daily 28 tablet 0     No Known Allergies  Recent Labs   Lab Test  12/01/16   0933  09/05/14   2245  10/24/11   1230   LDL  191*   --   159*   HDL  39*   --   39*   TRIG  163*   --   121   ALT  37  29  31   CR  0.80  0.84  0.86   GFRESTIMATED  >90  Non  GFR Calc    >90  Non  GFR Calc    >90   GFRESTBLACK  >90   GFR Calc    >90   GFR Calc    >90   POTASSIUM  4.6  3.6  4.6      BP Readings from Last 3 Encounters:   06/16/17 120/70   01/19/17 151/89   01/18/17 127/80    Wt Readings from Last 3 Encounters:   06/16/17 157 lb 8 oz (71.4 kg)   01/19/17 158 lb (71.7 kg)   01/18/17 158 lb (71.7 kg)                  Labs reviewed in EPIC    Reviewed and updated as needed this visit by clinical staff       Reviewed and updated as needed this visit by Provider         ROS:  Constitutional, HEENT, cardiovascular, pulmonary, GI, , musculoskeletal, neuro, skin,  "endocrine and psych systems are negative, except as otherwise noted.    OBJECTIVE:                                                    /70  Pulse 69  Temp 99.4  F (37.4  C) (Oral)  Resp 16  Ht 5' 6\" (1.676 m)  Wt 157 lb 8 oz (71.4 kg)  SpO2 98%  BMI 25.42 kg/m2  Body mass index is 25.42 kg/(m^2).  GENERAL: healthy, alert and no distress  EYES: Eyes grossly normal to inspection, PERRL and conjunctivae and sclerae normal  RESP: lungs clear to auscultation - no rales, rhonchi or wheezes  CV: regular rate and rhythm, normal S1 S2, no S3 or S4, no murmur, click or rub, no peripheral edema and peripheral pulses strong  ABDOMEN: soft, non tender, no hepatosplenomegaly, no masses and bowel sounds normal  MS: no gross musculoskeletal defects noted, no edema  SKIN: erythematous red raised circular rash right axilla non blanching. No tenderness, no bulls eye or punctum seen   NEURO: Normal strength and tone, mentation intact and speech normal  PSYCH: mentation appears normal, affect normal/bright    Diagnostic Test Results:  No results found for this or any previous visit (from the past 24 hour(s)).     ASSESSMENT/PLAN:                                                      1. Rash and other nonspecific skin eruption  Smoker, HLD, impaired fasting glucose, hx of prostate cancer under care of urology to have surgery in July, prior B/l inguinal hernia repair in 2016, hx of kidney stones, under care of PCP Dr Medeiros. [place] prescription [place] shows received norco 1/18/17 # 25. Here for rash in axillary area and worried it is lyme's  Rash under right arm could be lyme's but is non specific and could be local reaction from a bite versus cellulitis from skin bacteria . Advised benadryl cream OTC. Doxycyline twice a day for 2 weeks empirical as convinced its lyme's but would also cover staph and strep. Declined lyme's blood test two step process. Counseled abx can cause a photosensitive rash to use sunscreen and cover up for " duration of antibiotic. symptoms of fatigue proceeded rash and since on Lupron. So suspect those symptoms from that. Advised to eat a probiotic like acidophilus daily while on antibiotic . See dermatology if no answers. If gets worse has high fever or increased redness welling warmth pain go to the ER . Follow up Primary Dr Medeiros if not better. Can make no recommendations on xander silver   - DERMATOLOGY REFERRAL  - doxycycline (VIBRA-TABS) 100 MG tablet; Take 1 tablet (100 mg) by mouth 2 times daily  Dispense: 28 tablet; Refill: 0    See Patient Instructions  Patient Instructions   Rash under right arm could be lymes but is non specific and could be local reaction from a bite versus cellulitis form skin bacteria   Doxycyline twice a day for 2 weeks   Eat a probiotic like acidophilus daily while on antibiotic   See dermatology if no answers  If gets worse has high fever or increased redness welling warmth pain go to the ER   Follow up Primary Dr Medeiros if not better   Can make no recommendations on xander silver       Isabela Esparza MD  [facility]

## 2017-06-16 NOTE — MR AVS SNAPSHOT
After Visit Summary   6/16/2017    Arthur Jefferson    MRN: [id number]           Patient Information     Date Of Birth          1955        Visit Information        Provider Department      6/16/2017 8:00 AM Isabela Esparza MD [facility]        Today's Diagnoses     Rash and other nonspecific skin eruption    -  1      Care Instructions    Rash under right arm could be lymes but is non specific and could be local reaction from a bite versus cellulitis form skin bacteria   Doxycyline twice a day for 2 weeks   Eat a probiotic like acidophilus daily while on antibiotic   See dermatology if no answers  If gets worse has high fever or increased redness welling warmth pain go to the ER   Follow up Primary Dr Medeiros if not better   Can make no recommendations on xander silver           Follow-ups after your visit        Additional Services     DERMATOLOGY REFERRAL       Your provider has referred you to: [facility]: [facility] [phone]   [email or web address]  G: [place] [facility] [place] [phone]   [email or web address]/[place]/  [facility]: [facility] [place] [phone]   [email or web address]    Please be aware that coverage of these services is subject to the terms and limitations of your health insurance plan.  Call member services at your health plan with any benefit or coverage questions.      Please bring the following with you to your appointment:    (1) Any X-Rays, CTs or MRIs which have been performed.  Contact the facility where they were done to arrange for  prior to your scheduled appointment.  Any new CT, MRI or other procedures ordered by your specialist must be performed at a [place] facility or coordinated by your clinic's referral office.  (2) List of current medications  (3) This referral request   (4) Any  "documents/labs given to you for this referral                  Your next 10 appointments already scheduled     2017  8:40 AM CDT   Pre-Op physical with Naty Medeiros MD   [facility]    [address] [place] [id number] [phone]            2017   Procedure with Oswaldo Hargrove MD   [facility] (--)    [address], [address]  [facility] [address] [phone]              Who to contact     If you have questions or need follow up information about today's clinic visit or your schedule please contact [facility] directly at [phone].  Normal or non-critical lab and imaging results will be communicated to you by MyChart, letter or phone within 4 business days after the clinic has received the results. If you do not hear from us within 7 days, please contact the clinic through MyChart or phone. If you have a critical or abnormal lab result, we will notify you by phone as soon as possible.  Submit refill requests through auto[employer] or call your pharmacy and they will forward the refill request to us. Please allow 3 business days for your refill to be completed.          Additional Information About Your Visit        [employer] Information     autoGraph lets you send messages to your doctor, view your test results, renew your prescriptions, schedule appointments and more. To sign up, go to [email or web address] . Click on \"Log in\" on the left side of the screen, which will take you to the Welcome page. Then click on \"Sign up Now\" on the right side of the page.     You will be asked to enter the access code listed below, as well as some personal information. Please follow the directions to create your username and password.     Your access code is: [email or web address]  Expires: 2017  8:25 AM     Your access code will  in 90 days. If you need help or a new code, please call your [facility] or " "[phone].        Care EveryWhere ID     This is your Care EveryWhere ID. This could be used by other organizations to access your [place] medical records  [id number]        Your Vitals Were     Pulse Temperature Respirations Height Pulse Oximetry BMI (Body Mass Index)    69 99.4  F (37.4  C) (Oral) 16 5' 6\" (1.676 m) 98% 25.42 kg/m2       Blood Pressure from Last 3 Encounters:   06/16/17 120/70   01/19/17 151/89   01/18/17 127/80    Weight from Last 3 Encounters:   06/16/17 157 lb 8 oz (71.4 kg)   01/19/17 158 lb (71.7 kg)   01/18/17 158 lb (71.7 kg)              We Performed the Following     DERMATOLOGY REFERRAL          Today's Medication Changes          These changes are accurate as of: 6/16/17  8:25 AM.  If you have any questions, ask your nurse or doctor.               Start taking these medicines.        Dose/Directions    doxycycline 100 MG tablet   Commonly known as:  VIBRA-TABS   Used for:  Rash and other nonspecific skin eruption   Started by:  Isabela Esparza MD        Dose:  100 mg   Take 1 tablet (100 mg) by mouth 2 times daily   Quantity:  28 tablet   Refills:  0            Where to get your medicines      These medications were sent to [facility] [address] [place] [address] AT [address] [address]    Hours:  24-hours Phone:  [phone]     doxycycline 100 MG tablet                Primary Care Provider Office Phone # Fax #    Naty Medeiros [phone]       [facility] [address] [facility] [address]        Thank you!     Thank you for choosing [facility]  for your care. Our goal is always to provide you with excellent care. Hearing back from our patients is one way we can continue to improve our services. Please take a few minutes to complete the written survey that you may receive in the mail after your visit with us. Thank you!             Your Updated " Medication List - Protect others around you: Learn how to safely use, store and throw away your medicines at www.disposemymeds.org.          This list is accurate as of: 6/16/17  8:25 AM.  Always use your most recent med list.                   Brand Name Dispense Instructions for use    doxycycline 100 MG tablet    VIBRA-TABS    28 tablet    Take 1 tablet (100 mg) by mouth 2 times daily

## 2017-07-17 RX ORDER — MULTIPLE VITAMINS W/ MINERALS TAB 9MG-400MCG
1 TAB ORAL EVERY OTHER DAY
COMMUNITY

## 2017-07-21 ENCOUNTER — OFFICE VISIT (OUTPATIENT)
Dept: FAMILY MEDICINE | Facility: CLINIC | Age: 62
End: 2017-07-21
Payer: COMMERCIAL

## 2017-07-21 VITALS
TEMPERATURE: 97.3 F | RESPIRATION RATE: 16 BRPM | BODY MASS INDEX: 25.34 KG/M2 | WEIGHT: 157 LBS | SYSTOLIC BLOOD PRESSURE: 115 MMHG | HEART RATE: 60 BPM | OXYGEN SATURATION: 96 % | DIASTOLIC BLOOD PRESSURE: 62 MMHG

## 2017-07-21 DIAGNOSIS — C61 PROSTATE CANCER (H): ICD-10-CM

## 2017-07-21 DIAGNOSIS — Z01.818 PREOP GENERAL PHYSICAL EXAM: Primary | ICD-10-CM

## 2017-07-21 DIAGNOSIS — L98.9 SKIN LESION: ICD-10-CM

## 2017-07-21 LAB
ANION GAP SERPL CALCULATED.3IONS-SCNC: 5 MMOL/L (ref 3–14)
BUN SERPL-MCNC: 18 MG/DL (ref 7–30)
CALCIUM SERPL-MCNC: 9.5 MG/DL (ref 8.5–10.1)
CHLORIDE SERPL-SCNC: 108 MMOL/L (ref 94–109)
CO2 SERPL-SCNC: 27 MMOL/L (ref 20–32)
CREAT SERPL-MCNC: 0.88 MG/DL (ref 0.66–1.25)
ERYTHROCYTE [DISTWIDTH] IN BLOOD BY AUTOMATED COUNT: 14 % (ref 10–15)
GFR SERPL CREATININE-BSD FRML MDRD: 87 ML/MIN/1.7M2
GLUCOSE SERPL-MCNC: 101 MG/DL (ref 70–99)
HCT VFR BLD AUTO: 40 % (ref 40–53)
HGB BLD-MCNC: 13.6 G/DL (ref 13.3–17.7)
MCH RBC QN AUTO: 30.7 PG (ref 26.5–33)
MCHC RBC AUTO-ENTMCNC: 34 G/DL (ref 31.5–36.5)
MCV RBC AUTO: 90 FL (ref 78–100)
PLATELET # BLD AUTO: 233 10E9/L (ref 150–450)
POTASSIUM SERPL-SCNC: 4.5 MMOL/L (ref 3.4–5.3)
RBC # BLD AUTO: 4.43 10E12/L (ref 4.4–5.9)
SODIUM SERPL-SCNC: 140 MMOL/L (ref 133–144)
WBC # BLD AUTO: 5.3 10E9/L (ref 4–11)

## 2017-07-21 PROCEDURE — 80048 BASIC METABOLIC PNL TOTAL CA: CPT | Performed by: FAMILY MEDICINE

## 2017-07-21 PROCEDURE — 36415 COLL VENOUS BLD VENIPUNCTURE: CPT | Performed by: FAMILY MEDICINE

## 2017-07-21 PROCEDURE — 99214 OFFICE O/P EST MOD 30 MIN: CPT | Performed by: FAMILY MEDICINE

## 2017-07-21 PROCEDURE — 93000 ELECTROCARDIOGRAM COMPLETE: CPT | Performed by: FAMILY MEDICINE

## 2017-07-21 PROCEDURE — 85027 COMPLETE CBC AUTOMATED: CPT | Performed by: FAMILY MEDICINE

## 2017-07-21 NOTE — H&P (VIEW-ONLY)
[place] [id number] [place] [id number] [phone]  Dept: [phone]    PRE-OP EVALUATION:  Today's date: 2017    Arthur Jefferson (: 1955) presents for pre-operative evaluation assessment as requested by Oswaldo Acevedo MD.  He requires evaluation and anesthesia risk assessment prior to undergoing surgery/procedure for treatment of prostate surgery.  Proposed procedure: ROBOTIC ASSISTED  RADICAL PROSTATECTOMY ; POSSIBLE CONVERSION TO OPEN.     Date of Surgery/ Procedure: 17  Time of Surgery/ Procedure: 7:20 am  Hospital/Surgical Facility: [facility]   Primary Physician: Naty Medeiros  Type of Anesthesia Anticipated: General    Patient has a Health Care Directive or Living Will:  NO    Preop Questions 2017   1.  Do you have a history of heart attack, stroke, stent, bypass or surgery on an artery in the head, neck, heart or legs? No   2.  Do you ever have any pain or discomfort in your chest? No   3.  Do you have a history of  Heart Failure? No   4.   Are you troubled by shortness of breath when:  walking on a level surface, or up a slight hill, or at night? No   5.  Do you currently have a cold, bronchitis or other respiratory infection? No   6.  Do you have a cough, shortness of breath, or wheezing? No   7.  Do you sometimes get pains in the calves of your legs when you walk? No   8. Do you or anyone in your family have previous history of blood clots? No   9.  Do you or does anyone in your family have a serious bleeding problem such as prolonged bleeding following surgeries or cuts? No   10. Have you ever had problems with anemia or been told to take iron pills? No   11. Have you had any abnormal blood loss such as black, tarry or bloody stools? No   12. Have you ever had a blood transfusion? No   13. Have you or any of your relatives ever had problems with anesthesia? No   14. Do you have sleep apnea, excessive snoring or daytime  drowsiness? No   15. Do you have any prosthetic heart valves? No   16. Do you have prosthetic joints? No           HPI:                                                      Brief HPI related to upcoming procedure: Prostate cancer - Pt experiences a few hot flashes. He received 4 months of hormonal tx.       See problem list for active medical problems.  Problems all longstanding and stable, except as noted/documented.  See ROS for pertinent symptoms related to these conditions.                                                                                                  .    MEDICAL HISTORY:                                                    Patient Active Problem List    Diagnosis Date Noted     Prostate cancer (H) 03/03/2017     Priority: Medium     Dr. [facility]       Elevated fasting glucose 12/02/2016     Priority: Medium     Mixed hyperlipidemia 05/09/2010     Priority: Medium      Past Medical History:   Diagnosis Date     BPH (benign prostatic hyperplasia)      Hyperlipidemia LDL goal <130      Prostate cancer (H) 3/3/2017    Dr. Hargrove, [facility]     Renal disease     multiple kidney stones     Past Surgical History:   Procedure Laterality Date     HERNIORRHAPHY INGUINAL      bilateral, one open, one laparoscopic     LAPAROSCOPIC HERNIORRHAPHY INGUINAL Right 1/18/2017    Procedure: LAPAROSCOPIC HERNIORRHAPHY INGUINAL;  Surgeon: Kwame Prado MD;  [facility]: [facility]     Current Outpatient Prescriptions   Medication Sig Dispense Refill     multivitamin, therapeutic with minerals (THERA-VIT-M) TABS tablet Take 1 tablet by mouth every other day       Ascorbic Acid (VITAMIN C PO) Take 1 tablet by mouth daily       VITAMIN E PO Take 1 tablet by mouth every other day       OTC products: None, except as noted above    No Known Allergies   Latex Allergy: NO    Social History   Substance Use Topics     Smoking status: Ex-smoker            Smokeless tobacco: Not on file     Alcohol use  1.2 oz/week     2 Standard drinks or equivalent per week     History   Drug Use No       REVIEW OF SYSTEMS:                                                    C: NEGATIVE for fever, chills, change in weight  I: scalp lesion present for 20 years, no increase in size   E: NEGATIVE for vision changes or irritation  E/M: NEGATIVE for ear, mouth and throat problems  R: NEGATIVE for significant cough or SOB  B: NEGATIVE for masses, tenderness or discharge  CV: NEGATIVE for chest pain, palpitations or peripheral edema  GI: + intermittent suprapubic abdominal pain and bloating (improved over the last few weeks)   : + urinates once or twice/night   M: NEGATIVE for significant arthralgias or myalgia  N: NEGATIVE for weakness, dizziness or paresthesias  E: NEGATIVE for temperature intolerance, skin/hair changes  H: NEGATIVE for bleeding problems  P: NEGATIVE for changes in mood or affect    EXAM:                                                    /62 (BP Location: Right arm, Patient Position: Chair, Cuff Size: Adult Regular)  Pulse 60  Temp 97.3  F (36.3  C) (Tympanic)  Resp 16  Wt 157 lb (71.2 kg)  SpO2 96%  BMI 25.34 kg/m2    GENERAL APPEARANCE: healthy, alert and no distress     EYES: EOMI,  PERRL     HENT: nose and mouth without ulcers or lesions     RESP: lungs clear to auscultation - no rales, rhonchi or wheezes     CV: regular rates and rhythm, normal S1 S2     ABDOMEN:  soft, nontender, no HSM or masses and bowel sounds normal     MS: extremities normal- no gross deformities noted, no evidence of inflammation in joints, FROM in all extremities.     SKIN: right parietal scalp with skin colored round plaque     NEURO: Normal strength and tone, sensory exam grossly normal, mentation intact and speech normal     PSYCH: mentation appears normal. and affect normal/bright    DIAGNOSTICS:                                                    EKG: appears normal, NSR, normal axis, normal intervals, no acute ST/T  changes c/w ischemia, no LVH by voltage criteria, unchanged from previous tracings    WBC 5.3  4.0 - 11.0 10e9/L Final 07/21/2017  8:53 AM 57   RBC Count 4.43  4.4 - 5.9 10e12/L Final 07/21/2017  8:53 AM 57   Hemoglobin 13.6  13.3 - 17.7 g/dL Final 07/21/2017  8:53 AM 57   Hematocrit 40.0  40.0 - 53.0 % Final 07/21/2017  8:53 AM 57   MCV 90  78 - 100 fl Final 07/21/2017  8:53 AM 57   MCH 30.7  26.5 - 33.0 pg Final 07/21/2017  8:53 AM 57   MCHC 34.0  31.5 - 36.5 g/dL Final 07/21/2017  8:53 AM 57   RDW 14.0  10.0 - 15.0 % Final 07/21/2017  8:53 AM 57   Platelet Count 233  150 - 450 10e9/L Final 07/21/2017  8:53 AM 57     BMP pending     IMPRESSION:                                                    Reason for surgery/procedure: prostate cancer   Diagnosis/reason for consult: pre-op     The proposed surgical procedure is considered INTERMEDIATE risk.    REVISED CARDIAC RISK INDEX  The patient has the following serious cardiovascular risks for perioperative complications such as (MI, PE, VFib and 3  AV Block):  No serious cardiac risks  INTERPRETATION: 0 risks: Class I (very low risk - 0.4% complication rate)    The patient has the following additional risks for perioperative complications:  No identified additional risks      RECOMMENDATIONS:                                                      --Consult hospital rounder / IM to assist post-op medical management if needed    --Patient is to hold all scheduled medications on the day of surgery.   No Advil or Aleve 3 days before surgery.  No Aspirin 7 days before surgery.    APPROVAL GIVEN to proceed with proposed procedure, without further diagnostic evaluation       Signed Electronically by: Maria De Jesus Whyte MD    Copy of this evaluation report is provided to requesting physician.    [facility] Preop Guidelines

## 2017-07-21 NOTE — NURSING NOTE
"Chief Complaint   Patient presents with     Pre-Op Exam     DOS:7/25/17       Initial /62 (BP Location: Right arm, Patient Position: Chair, Cuff Size: Adult Regular)  Pulse 60  Temp 97.3  F (36.3  C) (Tympanic)  Resp 16  Wt 157 lb (71.2 kg)  SpO2 96%  BMI 25.34 kg/m2 Estimated body mass index is 25.34 kg/(m^2) as calculated from the following:    Height as of 6/16/17: 5' 6\" (1.676 m).    Weight as of this encounter: 157 lb (71.2 kg).  Medication Reconciliation: complete     Guy Morris MA      "

## 2017-07-21 NOTE — MR AVS SNAPSHOT
After Visit Summary   7/21/2017    Arthur Jefferson    MRN: [id number]           Patient Information     Date Of Birth          1955        Visit Information        Provider Department      7/21/2017 8:00 AM Maria De Jesus Whyte MD [facility]        Today's Diagnoses     Preop general physical exam    -  1    Prostate cancer (H)        Skin lesion          Care Instructions      No Advil or Aleve 3 days before surgery.  No Aspirin 7 days before surgery.  Hold all medications on the day of the surgery.       Before Your Surgery      Call your surgeon if there is any change in your health. This includes signs of a cold or flu (such as a sore throat, runny nose, cough, rash or fever).    Do not smoke, drink alcohol or take over the counter medicine (unless your surgeon or primary care doctor tells you to) for the 24 hours before and after surgery.    If you take prescribed drugs: Follow your doctor s orders about which medicines to take and which to stop until after surgery.    Eating and drinking prior to surgery: follow the instructions from your surgeon    Take a shower or bath the night before surgery. Use the soap your surgeon gave you to gently clean your skin. If you do not have soap from your surgeon, use your regular soap. Do not shave or scrub the surgery site.  Wear clean pajamas and have clean sheets on your bed.           Follow-ups after your visit        Additional Services     DERMATOLOGY REFERRAL       Your provider has referred you to: [facility]: [facility] Dermatology [facility] [phone]   [email or web address]  [facility]: Dermatology [facility] [phone]   [email or web address]    Please be aware that coverage of these services is subject to the terms and limitations of your health insurance plan.  Call member services at your health plan with any benefit or coverage questions.   "    Please bring the following with you to your appointment:    (1) Any X-Rays, CTs or MRIs which have been performed.  Contact the facility where they were done to arrange for  prior to your scheduled appointment.    (2) List of current medications  (3) This referral request   (4) Any documents/labs given to you for this referral                  Your next 10 appointments already scheduled     Jul 25, 2017   Procedure with Oswaldo Hargrove MD   [facility] (--)    [address]  [facility] [address] [phone]              Who to contact     If you have questions or need follow up information about today's clinic visit or your schedule please contact [facility] directly at [phone].  Normal or non-critical lab and imaging results will be communicated to you by MyChart, letter or phone within 4 business days after the clinic has received the results. If you do not hear from us within 7 days, please contact the clinic through MyChart or phone. If you have a critical or abnormal lab result, we will notify you by phone as soon as possible.  Submit refill requests through [employer] or call your pharmacy and they will forward the refill request to us. Please allow 3 business days for your refill to be completed.          Additional Information About Your Visit        [employer] Information     [employer] lets you send messages to your doctor, view your test results, renew your prescriptions, schedule appointments and more. To sign up, go to [email or web address] . Click on \"Log in\" on the left side of the screen, which will take you to the Welcome page. Then click on \"Sign up Now\" on the right side of the page.     You will be asked to enter the access code listed below, as well as some personal information. Please follow the directions to create your username and password.     Your access code is: [email or web address]  Expires: 9/14/2017  8:25 AM     Your access code " will  in 90 days. If you need help or a new code, please call your [facility] clinic or [phone].        Care EveryWhere ID     This is your Care EveryWhere ID. This could be used by other organizations to access your [facility] medical records  [id number]        Your Vitals Were     Pulse Temperature Respirations Pulse Oximetry BMI (Body Mass Index)       60 97.3  F (36.3  C) (Tympanic) 16 96% 25.34 kg/m2        Blood Pressure from Last 3 Encounters:   17 115/62   17 120/70   17 151/89    Weight from Last 3 Encounters:   17 157 lb (71.2 kg)   17 157 lb 8 oz (71.4 kg)   17 158 lb (71.7 kg)              We Performed the Following     Basic metabolic panel  (Ca, Cl, CO2, Creat, Gluc, K, Na, BUN)     DERMATOLOGY REFERRAL     Hemoglobin        Primary Care Provider Office Phone # Fax #    Naty Medeiros [phone]       [facility] [address]  [facility] [address]        Equal Access to Services     JASON NUÑEZ : Hadii aad ku hadasho Sojean claudeali, waaxda luqadaha, qaybta kaalmada adeegyada, kemal zaraten manuela jewell . So [facility] [phone].    ATENCIÓN: Si habla español, tiene a hidalgo disposición servicios gratuitos de asistencia lingüística. Llame al [phone].    We comply with applicable federal civil rights laws and [place] laws. We do not discriminate on the basis of race, color, national origin, age, disability sex, sexual orientation or gender identity.            Thank you!     Thank you for choosing [facility]  for your care. Our goal is always to provide you with excellent care. Hearing back from our patients is one way we can continue to improve our services. Please take a few minutes to complete the written survey that you may receive in the mail after your visit with us. Thank you!             Your Updated Medication List - Protect others around you: Learn how to safely use, store and throw away  your medicines at www.disposemymeds.org.          This list is accurate as of: 7/21/17  8:37 AM.  Always use your most recent med list.                   Brand Name Dispense Instructions for use Diagnosis    multivitamin, therapeutic with minerals Tabs tablet      Take 1 tablet by mouth every other day        VITAMIN C PO      Take 1 tablet by mouth daily        VITAMIN E PO      Take 1 tablet by mouth every other day

## 2017-07-21 NOTE — LETTER
Arthur Jefferson  [address]  [facility] [address]        July 26, 2017          Dear ,    We are writing to inform you of your test results.    Here are your results for your recent labs. Your labs were reassuring. Please call or message me if you have questions or concerns.     Resulted Orders   Basic metabolic panel  (Ca, Cl, CO2, Creat, Gluc, K, Na, BUN)   Result Value Ref Range    Sodium 140 133 - 144 mmol/L    Potassium 4.5 3.4 - 5.3 mmol/L    Chloride 108 94 - 109 mmol/L    Carbon Dioxide 27 20 - 32 mmol/L    Anion Gap 5 3 - 14 mmol/L    Glucose 101 (H) 70 - 99 mg/dL      Comment:      Fasting specimen    Urea Nitrogen 18 7 - 30 mg/dL    Creatinine 0.88 0.66 - 1.25 mg/dL    GFR Estimate 87 >60 mL/min/1.7m2      Comment:      Non  GFR Calc    GFR Estimate If Black >90   GFR Calc   >60 mL/min/1.7m2    Calcium 9.5 8.5 - 10.1 mg/dL   CBC with platelets   Result Value Ref Range    WBC 5.3 4.0 - 11.0 10e9/L    RBC Count 4.43 4.4 - 5.9 10e12/L    Hemoglobin 13.6 13.3 - 17.7 g/dL    Hematocrit 40.0 40.0 - 53.0 %    MCV 90 78 - 100 fl    MCH 30.7 26.5 - 33.0 pg    MCHC 34.0 31.5 - 36.5 g/dL    RDW 14.0 10.0 - 15.0 %    Platelet Count 233 150 - 450 10e9/L       If you have any questions or concerns, please call the clinic at the number listed above.       Sincerely,        Maria De Jesus Whyte MD/nr

## 2017-07-21 NOTE — PATIENT INSTRUCTIONS
No Advil or Aleve 3 days before surgery.  No Aspirin 7 days before surgery.  Hold all medications on the day of the surgery.       Before Your Surgery      Call your surgeon if there is any change in your health. This includes signs of a cold or flu (such as a sore throat, runny nose, cough, rash or fever).    Do not smoke, drink alcohol or take over the counter medicine (unless your surgeon or primary care doctor tells you to) for the 24 hours before and after surgery.    If you take prescribed drugs: Follow your doctor s orders about which medicines to take and which to stop until after surgery.    Eating and drinking prior to surgery: follow the instructions from your surgeon    Take a shower or bath the night before surgery. Use the soap your surgeon gave you to gently clean your skin. If you do not have soap from your surgeon, use your regular soap. Do not shave or scrub the surgery site.  Wear clean pajamas and have clean sheets on your bed.

## 2017-07-21 NOTE — PROGRESS NOTES
[place] [id number] [place] [id number] [phone]  Dept: [phone]    PRE-OP EVALUATION:  Today's date: 2017    Arthur Jefferson (: 1955) presents for pre-operative evaluation assessment as requested by Oswaldo Acevedo MD.  He requires evaluation and anesthesia risk assessment prior to undergoing surgery/procedure for treatment of prostate surgery.  Proposed procedure: ROBOTIC ASSISTED  RADICAL PROSTATECTOMY ; POSSIBLE CONVERSION TO OPEN.     Date of Surgery/ Procedure: 17  Time of Surgery/ Procedure: 7:20 am  Hospital/Surgical Facility[facility]   Primary Physician: Naty Medeiros  Type of Anesthesia Anticipated: General    Patient has a Health Care Directive or Living Will:  NO    Preop Questions 2017   1.  Do you have a history of heart attack, stroke, stent, bypass or surgery on an artery in the head, neck, heart or legs? No   2.  Do you ever have any pain or discomfort in your chest? No   3.  Do you have a history of  Heart Failure? No   4.   Are you troubled by shortness of breath when:  walking on a level surface, or up a slight hill, or at night? No   5.  Do you currently have a cold, bronchitis or other respiratory infection? No   6.  Do you have a cough, shortness of breath, or wheezing? No   7.  Do you sometimes get pains in the calves of your legs when you walk? No   8. Do you or anyone in your family have previous history of blood clots? No   9.  Do you or does anyone in your family have a serious bleeding problem such as prolonged bleeding following surgeries or cuts? No   10. Have you ever had problems with anemia or been told to take iron pills? No   11. Have you had any abnormal blood loss such as black, tarry or bloody stools? No   12. Have you ever had a blood transfusion? No   13. Have you or any of your relatives ever had problems with anesthesia? No   14. Do you have sleep apnea, excessive snoring or daytime  drowsiness? No   15. Do you have any prosthetic heart valves? No   16. Do you have prosthetic joints? No           HPI:                                                      Brief HPI related to upcoming procedure: Prostate cancer - Pt experiences a few hot flashes. He received 4 months of hormonal tx.       See problem list for active medical problems.  Problems all longstanding and stable, except as noted/documented.  See ROS for pertinent symptoms related to these conditions.                                                                                                  .    MEDICAL HISTORY:                                                    Patient Active Problem List    Diagnosis Date Noted     Prostate cancer (H) 03/03/2017     Priority: Medium     Dr. Hargrove[facility]       Elevated fasting glucose 12/02/2016     Priority: Medium     Mixed hyperlipidemia 05/09/2010     Priority: Medium      Past Medical History:   Diagnosis Date     BPH (benign prostatic hyperplasia)      Hyperlipidemia LDL goal <130      Prostate cancer (H) 3/3/2017    Dr. Hargrove, [facility]     Renal disease     multiple kidney stones     Past Surgical History:   Procedure Laterality Date     HERNIORRHAPHY INGUINAL      bilateral, one open, one laparoscopic     LAPAROSCOPIC HERNIORRHAPHY INGUINAL Right 1/18/2017    Procedure: LAPAROSCOPIC HERNIORRHAPHY INGUINAL;  Surgeon: Kwame Prado MD;  Location: [facility]     Current Outpatient Prescriptions   Medication Sig Dispense Refill     multivitamin, therapeutic with minerals (THERA-VIT-M) TABS tablet Take 1 tablet by mouth every other day       Ascorbic Acid (VITAMIN C PO) Take 1 tablet by mouth daily       VITAMIN E PO Take 1 tablet by mouth every other day       OTC products: None, except as noted above    No Known Allergies   Latex Allergy: NO    Social History   Substance Use Topics     Smoking status: Ex-smoker            Smokeless tobacco: Not on file     Alcohol use  1.2 oz/week     2 Standard drinks or equivalent per week     History   Drug Use No       REVIEW OF SYSTEMS:                                                    C: NEGATIVE for fever, chills, change in weight  I: scalp lesion present for 20 years, no increase in size   E: NEGATIVE for vision changes or irritation  E/M: NEGATIVE for ear, mouth and throat problems  R: NEGATIVE for significant cough or SOB  B: NEGATIVE for masses, tenderness or discharge  CV: NEGATIVE for chest pain, palpitations or peripheral edema  GI: + intermittent suprapubic abdominal pain and bloating (improved over the last few weeks)   : + urinates once or twice/night   M: NEGATIVE for significant arthralgias or myalgia  N: NEGATIVE for weakness, dizziness or paresthesias  E: NEGATIVE for temperature intolerance, skin/hair changes  H: NEGATIVE for bleeding problems  P: NEGATIVE for changes in mood or affect    EXAM:                                                    /62 (BP Location: Right arm, Patient Position: Chair, Cuff Size: Adult Regular)  Pulse 60  Temp 97.3  F (36.3  C) (Tympanic)  Resp 16  Wt 157 lb (71.2 kg)  SpO2 96%  BMI 25.34 kg/m2    GENERAL APPEARANCE: healthy, alert and no distress     EYES: EOMI,  PERRL     HENT: nose and mouth without ulcers or lesions     RESP: lungs clear to auscultation - no rales, rhonchi or wheezes     CV: regular rates and rhythm, normal S1 S2     ABDOMEN:  soft, nontender, no HSM or masses and bowel sounds normal     MS: extremities normal- no gross deformities noted, no evidence of inflammation in joints, FROM in all extremities.     SKIN: right parietal scalp with skin colored round plaque     NEURO: Normal strength and tone, sensory exam grossly normal, mentation intact and speech normal     PSYCH: mentation appears normal. and affect normal/bright    DIAGNOSTICS:                                                    EKG: appears normal, NSR, normal axis, normal intervals, no acute ST/T  changes c/w ischemia, no LVH by voltage criteria, unchanged from previous tracings    WBC 5.3  4.0 - 11.0 10e9/L Final 07/21/2017  8:53 AM 57   RBC Count 4.43  4.4 - 5.9 10e12/L Final 07/21/2017  8:53 AM 57   Hemoglobin 13.6  13.3 - 17.7 g/dL Final 07/21/2017  8:53 AM 57   Hematocrit 40.0  40.0 - 53.0 % Final 07/21/2017  8:53 AM 57   MCV 90  78 - 100 fl Final 07/21/2017  8:53 AM 57   MCH 30.7  26.5 - 33.0 pg Final 07/21/2017  8:53 AM 57   MCHC 34.0  31.5 - 36.5 g/dL Final 07/21/2017  8:53 AM 57   RDW 14.0  10.0 - 15.0 % Final 07/21/2017  8:53 AM 57   Platelet Count 233  150 - 450 10e9/L Final 07/21/2017  8:53 AM 57     BMP pending     IMPRESSION:                                                    Reason for surgery/procedure: prostate cancer   Diagnosis/reason for consult: pre-op     The proposed surgical procedure is considered INTERMEDIATE risk.    REVISED CARDIAC RISK INDEX  The patient has the following serious cardiovascular risks for perioperative complications such as (MI, PE, VFib and 3  AV Block):  No serious cardiac risks  INTERPRETATION: 0 risks: Class I (very low risk - 0.4% complication rate)    The patient has the following additional risks for perioperative complications:  No identified additional risks      RECOMMENDATIONS:                                                      --Consult hospital rounder / IM to assist post-op medical management if needed    --Patient is to hold all scheduled medications on the day of surgery.   No Advil or Aleve 3 days before surgery.  No Aspirin 7 days before surgery.    APPROVAL GIVEN to proceed with proposed procedure, without further diagnostic evaluation       Signed Electronically by: Maria De Jesus Whyte MD    Copy of this evaluation report is provided to requesting physician.    [facility] Preop Guidelines

## 2017-07-25 ENCOUNTER — ANESTHESIA EVENT (OUTPATIENT)
Dept: SURGERY | Facility: CLINIC | Age: 62
DRG: 708 | End: 2017-07-25
Payer: COMMERCIAL

## 2017-07-25 ENCOUNTER — ANESTHESIA (OUTPATIENT)
Dept: SURGERY | Facility: CLINIC | Age: 62
DRG: 708 | End: 2017-07-25
Payer: COMMERCIAL

## 2017-07-25 ENCOUNTER — HOSPITAL ENCOUNTER (INPATIENT)
Facility: CLINIC | Age: 62
LOS: 1 days | Discharge: HOME | DRG: 708 | End: 2017-07-26
Attending: UROLOGY | Admitting: UROLOGY
Payer: COMMERCIAL

## 2017-07-25 DIAGNOSIS — C61 PROSTATE CANCER (H): Primary | ICD-10-CM

## 2017-07-25 LAB
ABO + RH BLD: NORMAL
ABO + RH BLD: NORMAL
BLD GP AB SCN SERPL QL: NORMAL
BLOOD BANK CMNT PATIENT-IMP: NORMAL
SPECIMEN EXP DATE BLD: NORMAL

## 2017-07-25 PROCEDURE — 25000125 ZZHC RX 250: Performed by: UROLOGY

## 2017-07-25 PROCEDURE — 8E0W4CZ ROBOTIC ASSISTED PROCEDURE OF TRUNK REGION, PERCUTANEOUS ENDOSCOPIC APPROACH: ICD-10-PCS | Performed by: UROLOGY

## 2017-07-25 PROCEDURE — 36000087 ZZH SURGERY LEVEL 8 EA 15 ADDTL MIN: Performed by: UROLOGY

## 2017-07-25 PROCEDURE — 12000000 ZZH R&B MED SURG/OB

## 2017-07-25 PROCEDURE — 88309 TISSUE EXAM BY PATHOLOGIST: CPT | Performed by: UROLOGY

## 2017-07-25 PROCEDURE — 25000128 H RX IP 250 OP 636: Performed by: UROLOGY

## 2017-07-25 PROCEDURE — 07BC4ZZ EXCISION OF PELVIS LYMPHATIC, PERCUTANEOUS ENDOSCOPIC APPROACH: ICD-10-PCS | Performed by: UROLOGY

## 2017-07-25 PROCEDURE — 37000009 ZZH ANESTHESIA TECHNICAL FEE, EACH ADDTL 15 MIN: Performed by: UROLOGY

## 2017-07-25 PROCEDURE — 25000132 ZZH RX MED GY IP 250 OP 250 PS 637: Performed by: UROLOGY

## 2017-07-25 PROCEDURE — 71000013 ZZH RECOVERY PHASE 1 LEVEL 1 EA ADDTL HR: Performed by: UROLOGY

## 2017-07-25 PROCEDURE — 86900 BLOOD TYPING SEROLOGIC ABO: CPT | Performed by: UROLOGY

## 2017-07-25 PROCEDURE — 36000085 ZZH SURGERY LEVEL 8 1ST 30 MIN: Performed by: UROLOGY

## 2017-07-25 PROCEDURE — 71000012 ZZH RECOVERY PHASE 1 LEVEL 1 FIRST HR: Performed by: UROLOGY

## 2017-07-25 PROCEDURE — 25000125 ZZHC RX 250: Performed by: ANESTHESIOLOGY

## 2017-07-25 PROCEDURE — 25000128 H RX IP 250 OP 636: Performed by: ANESTHESIOLOGY

## 2017-07-25 PROCEDURE — 37000008 ZZH ANESTHESIA TECHNICAL FEE, 1ST 30 MIN: Performed by: UROLOGY

## 2017-07-25 PROCEDURE — 0VT34ZZ RESECTION OF BILATERAL SEMINAL VESICLES, PERCUTANEOUS ENDOSCOPIC APPROACH: ICD-10-PCS | Performed by: UROLOGY

## 2017-07-25 PROCEDURE — 25000125 ZZHC RX 250: Performed by: NURSE ANESTHETIST, CERTIFIED REGISTERED

## 2017-07-25 PROCEDURE — 88309 TISSUE EXAM BY PATHOLOGIST: CPT | Mod: 26 | Performed by: UROLOGY

## 2017-07-25 PROCEDURE — 25000566 ZZH SEVOFLURANE, EA 15 MIN: Performed by: UROLOGY

## 2017-07-25 PROCEDURE — 0VT04ZZ RESECTION OF PROSTATE, PERCUTANEOUS ENDOSCOPIC APPROACH: ICD-10-PCS | Performed by: UROLOGY

## 2017-07-25 PROCEDURE — 0VBQ4ZZ EXCISION OF BILATERAL VAS DEFERENS, PERCUTANEOUS ENDOSCOPIC APPROACH: ICD-10-PCS | Performed by: UROLOGY

## 2017-07-25 PROCEDURE — 25000128 H RX IP 250 OP 636: Performed by: NURSE ANESTHETIST, CERTIFIED REGISTERED

## 2017-07-25 PROCEDURE — 25800025 ZZH RX 258: Performed by: UROLOGY

## 2017-07-25 PROCEDURE — 36415 COLL VENOUS BLD VENIPUNCTURE: CPT | Performed by: UROLOGY

## 2017-07-25 PROCEDURE — 27210995 ZZH RX 272: Performed by: UROLOGY

## 2017-07-25 PROCEDURE — S0020 INJECTION, BUPIVICAINE HYDRO: HCPCS | Performed by: UROLOGY

## 2017-07-25 PROCEDURE — 27210794 ZZH OR GENERAL SUPPLY STERILE: Performed by: UROLOGY

## 2017-07-25 PROCEDURE — 86901 BLOOD TYPING SEROLOGIC RH(D): CPT | Performed by: UROLOGY

## 2017-07-25 PROCEDURE — 86850 RBC ANTIBODY SCREEN: CPT | Performed by: UROLOGY

## 2017-07-25 PROCEDURE — 40000170 ZZH STATISTIC PRE-PROCEDURE ASSESSMENT II: Performed by: UROLOGY

## 2017-07-25 RX ORDER — ONDANSETRON 4 MG/1
4 TABLET, ORALLY DISINTEGRATING ORAL EVERY 30 MIN PRN
Status: DISCONTINUED | OUTPATIENT
Start: 2017-07-25 | End: 2017-07-25 | Stop reason: HOSPADM

## 2017-07-25 RX ORDER — MAGNESIUM HYDROXIDE 1200 MG/15ML
LIQUID ORAL PRN
Status: DISCONTINUED | OUTPATIENT
Start: 2017-07-25 | End: 2017-07-25 | Stop reason: HOSPADM

## 2017-07-25 RX ORDER — HYDROMORPHONE HYDROCHLORIDE 1 MG/ML
.3-.5 INJECTION, SOLUTION INTRAMUSCULAR; INTRAVENOUS; SUBCUTANEOUS
Status: DISCONTINUED | OUTPATIENT
Start: 2017-07-25 | End: 2017-07-26 | Stop reason: HOSPADM

## 2017-07-25 RX ORDER — EPHEDRINE SULFATE 50 MG/ML
INJECTION, SOLUTION INTRAMUSCULAR; INTRAVENOUS; SUBCUTANEOUS PRN
Status: DISCONTINUED | OUTPATIENT
Start: 2017-07-25 | End: 2017-07-25

## 2017-07-25 RX ORDER — NALOXONE HYDROCHLORIDE 0.4 MG/ML
.1-.4 INJECTION, SOLUTION INTRAMUSCULAR; INTRAVENOUS; SUBCUTANEOUS
Status: DISCONTINUED | OUTPATIENT
Start: 2017-07-25 | End: 2017-07-26 | Stop reason: HOSPADM

## 2017-07-25 RX ORDER — ACETAMINOPHEN 10 MG/ML
1000 INJECTION, SOLUTION INTRAVENOUS
Status: COMPLETED | OUTPATIENT
Start: 2017-07-25 | End: 2017-07-25

## 2017-07-25 RX ORDER — DIPHENHYDRAMINE HYDROCHLORIDE 50 MG/ML
25 INJECTION INTRAMUSCULAR; INTRAVENOUS EVERY 6 HOURS PRN
Status: DISCONTINUED | OUTPATIENT
Start: 2017-07-25 | End: 2017-07-26 | Stop reason: HOSPADM

## 2017-07-25 RX ORDER — DEXAMETHASONE SODIUM PHOSPHATE 4 MG/ML
INJECTION, SOLUTION INTRA-ARTICULAR; INTRALESIONAL; INTRAMUSCULAR; INTRAVENOUS; SOFT TISSUE PRN
Status: DISCONTINUED | OUTPATIENT
Start: 2017-07-25 | End: 2017-07-25

## 2017-07-25 RX ORDER — LIDOCAINE 40 MG/G
CREAM TOPICAL
Status: DISCONTINUED | OUTPATIENT
Start: 2017-07-25 | End: 2017-07-26 | Stop reason: HOSPADM

## 2017-07-25 RX ORDER — VECURONIUM BROMIDE 1 MG/ML
INJECTION, POWDER, LYOPHILIZED, FOR SOLUTION INTRAVENOUS PRN
Status: DISCONTINUED | OUTPATIENT
Start: 2017-07-25 | End: 2017-07-25

## 2017-07-25 RX ORDER — HYDROMORPHONE HYDROCHLORIDE 1 MG/ML
.3-.5 INJECTION, SOLUTION INTRAMUSCULAR; INTRAVENOUS; SUBCUTANEOUS EVERY 5 MIN PRN
Status: DISCONTINUED | OUTPATIENT
Start: 2017-07-25 | End: 2017-07-25 | Stop reason: HOSPADM

## 2017-07-25 RX ORDER — DIPHENHYDRAMINE HCL 25 MG
25 CAPSULE ORAL EVERY 6 HOURS PRN
Status: DISCONTINUED | OUTPATIENT
Start: 2017-07-25 | End: 2017-07-26 | Stop reason: HOSPADM

## 2017-07-25 RX ORDER — SODIUM CHLORIDE, SODIUM LACTATE, POTASSIUM CHLORIDE, CALCIUM CHLORIDE 600; 310; 30; 20 MG/100ML; MG/100ML; MG/100ML; MG/100ML
INJECTION, SOLUTION INTRAVENOUS CONTINUOUS
Status: DISCONTINUED | OUTPATIENT
Start: 2017-07-25 | End: 2017-07-26 | Stop reason: HOSPADM

## 2017-07-25 RX ORDER — ONDANSETRON 2 MG/ML
4 INJECTION INTRAMUSCULAR; INTRAVENOUS EVERY 30 MIN PRN
Status: DISCONTINUED | OUTPATIENT
Start: 2017-07-25 | End: 2017-07-25 | Stop reason: HOSPADM

## 2017-07-25 RX ORDER — OXYCODONE AND ACETAMINOPHEN 5; 325 MG/1; MG/1
1-2 TABLET ORAL EVERY 4 HOURS PRN
Status: DISCONTINUED | OUTPATIENT
Start: 2017-07-25 | End: 2017-07-26 | Stop reason: HOSPADM

## 2017-07-25 RX ORDER — CEFAZOLIN SODIUM 2 G/100ML
2 INJECTION, SOLUTION INTRAVENOUS
Status: COMPLETED | OUTPATIENT
Start: 2017-07-25 | End: 2017-07-25

## 2017-07-25 RX ORDER — FENTANYL CITRATE 50 UG/ML
INJECTION, SOLUTION INTRAMUSCULAR; INTRAVENOUS PRN
Status: DISCONTINUED | OUTPATIENT
Start: 2017-07-25 | End: 2017-07-25

## 2017-07-25 RX ORDER — PROPOFOL 10 MG/ML
INJECTION, EMULSION INTRAVENOUS PRN
Status: DISCONTINUED | OUTPATIENT
Start: 2017-07-25 | End: 2017-07-25

## 2017-07-25 RX ORDER — ONDANSETRON 2 MG/ML
4 INJECTION INTRAMUSCULAR; INTRAVENOUS EVERY 6 HOURS PRN
Status: DISCONTINUED | OUTPATIENT
Start: 2017-07-25 | End: 2017-07-26 | Stop reason: HOSPADM

## 2017-07-25 RX ORDER — LIDOCAINE HYDROCHLORIDE 20 MG/ML
INJECTION, SOLUTION INFILTRATION; PERINEURAL PRN
Status: DISCONTINUED | OUTPATIENT
Start: 2017-07-25 | End: 2017-07-25

## 2017-07-25 RX ORDER — CEFAZOLIN SODIUM 1 G/3ML
1 INJECTION, POWDER, FOR SOLUTION INTRAMUSCULAR; INTRAVENOUS EVERY 8 HOURS
Status: COMPLETED | OUTPATIENT
Start: 2017-07-25 | End: 2017-07-26

## 2017-07-25 RX ORDER — NEOSTIGMINE METHYLSULFATE 1 MG/ML
VIAL (ML) INJECTION PRN
Status: DISCONTINUED | OUTPATIENT
Start: 2017-07-25 | End: 2017-07-25

## 2017-07-25 RX ORDER — ONDANSETRON 4 MG/1
4 TABLET, ORALLY DISINTEGRATING ORAL EVERY 6 HOURS PRN
Status: DISCONTINUED | OUTPATIENT
Start: 2017-07-25 | End: 2017-07-26 | Stop reason: HOSPADM

## 2017-07-25 RX ORDER — SODIUM CHLORIDE, SODIUM LACTATE, POTASSIUM CHLORIDE, CALCIUM CHLORIDE 600; 310; 30; 20 MG/100ML; MG/100ML; MG/100ML; MG/100ML
INJECTION, SOLUTION INTRAVENOUS CONTINUOUS PRN
Status: DISCONTINUED | OUTPATIENT
Start: 2017-07-25 | End: 2017-07-25

## 2017-07-25 RX ORDER — SODIUM CHLORIDE, SODIUM LACTATE, POTASSIUM CHLORIDE, CALCIUM CHLORIDE 600; 310; 30; 20 MG/100ML; MG/100ML; MG/100ML; MG/100ML
INJECTION, SOLUTION INTRAVENOUS CONTINUOUS
Status: DISCONTINUED | OUTPATIENT
Start: 2017-07-25 | End: 2017-07-25 | Stop reason: HOSPADM

## 2017-07-25 RX ORDER — NEOMYCIN/BACITRACIN/POLYMYXINB 3.5-400-5K
OINTMENT (GRAM) TOPICAL 2 TIMES DAILY
Status: DISCONTINUED | OUTPATIENT
Start: 2017-07-25 | End: 2017-07-26 | Stop reason: HOSPADM

## 2017-07-25 RX ORDER — OXYCODONE AND ACETAMINOPHEN 5; 325 MG/1; MG/1
1-2 TABLET ORAL EVERY 4 HOURS PRN
Qty: 25 TABLET | Refills: 0 | Status: SHIPPED | OUTPATIENT
Start: 2017-07-25 | End: 2018-11-19

## 2017-07-25 RX ORDER — GLYCOPYRROLATE 0.2 MG/ML
INJECTION, SOLUTION INTRAMUSCULAR; INTRAVENOUS PRN
Status: DISCONTINUED | OUTPATIENT
Start: 2017-07-25 | End: 2017-07-25

## 2017-07-25 RX ORDER — BUPIVACAINE HYDROCHLORIDE 2.5 MG/ML
INJECTION, SOLUTION INFILTRATION; PERINEURAL PRN
Status: DISCONTINUED | OUTPATIENT
Start: 2017-07-25 | End: 2017-07-25 | Stop reason: HOSPADM

## 2017-07-25 RX ORDER — NEOMYCIN/BACITRACIN/POLYMYXINB 3.5-400-5K
OINTMENT (GRAM) TOPICAL
Status: DISCONTINUED | OUTPATIENT
Start: 2017-07-25 | End: 2017-07-26 | Stop reason: HOSPADM

## 2017-07-25 RX ORDER — FENTANYL CITRATE 50 UG/ML
25-50 INJECTION, SOLUTION INTRAMUSCULAR; INTRAVENOUS
Status: DISCONTINUED | OUTPATIENT
Start: 2017-07-25 | End: 2017-07-25 | Stop reason: HOSPADM

## 2017-07-25 RX ORDER — ONDANSETRON 2 MG/ML
INJECTION INTRAMUSCULAR; INTRAVENOUS PRN
Status: DISCONTINUED | OUTPATIENT
Start: 2017-07-25 | End: 2017-07-25

## 2017-07-25 RX ADMIN — Medication 5 MG: at 07:50

## 2017-07-25 RX ADMIN — CEFAZOLIN SODIUM 2 G: 2 INJECTION, SOLUTION INTRAVENOUS at 07:45

## 2017-07-25 RX ADMIN — FENTANYL CITRATE 50 MCG: 50 INJECTION, SOLUTION INTRAMUSCULAR; INTRAVENOUS at 08:00

## 2017-07-25 RX ADMIN — SODIUM CHLORIDE, POTASSIUM CHLORIDE, SODIUM LACTATE AND CALCIUM CHLORIDE: 600; 310; 30; 20 INJECTION, SOLUTION INTRAVENOUS at 06:12

## 2017-07-25 RX ADMIN — LIDOCAINE HYDROCHLORIDE 1 ML: 10 INJECTION, SOLUTION EPIDURAL; INFILTRATION; INTRACAUDAL; PERINEURAL at 06:13

## 2017-07-25 RX ADMIN — ONDANSETRON 4 MG: 2 INJECTION INTRAMUSCULAR; INTRAVENOUS at 10:03

## 2017-07-25 RX ADMIN — PROPOFOL 200 MG: 10 INJECTION, EMULSION INTRAVENOUS at 07:27

## 2017-07-25 RX ADMIN — NEOSTIGMINE METHYLSULFATE 3.5 MG: 1 INJECTION INTRAMUSCULAR; INTRAVENOUS; SUBCUTANEOUS at 10:30

## 2017-07-25 RX ADMIN — CEFAZOLIN SODIUM 1 G: 2 INJECTION, SOLUTION INTRAVENOUS at 09:39

## 2017-07-25 RX ADMIN — HYDROMORPHONE HYDROCHLORIDE 0.5 MG: 1 INJECTION, SOLUTION INTRAMUSCULAR; INTRAVENOUS; SUBCUTANEOUS at 20:27

## 2017-07-25 RX ADMIN — MIDAZOLAM HYDROCHLORIDE 2 MG: 1 INJECTION, SOLUTION INTRAMUSCULAR; INTRAVENOUS at 07:21

## 2017-07-25 RX ADMIN — HYDROMORPHONE HYDROCHLORIDE 0.5 MG: 1 INJECTION, SOLUTION INTRAMUSCULAR; INTRAVENOUS; SUBCUTANEOUS at 09:07

## 2017-07-25 RX ADMIN — PROPOFOL 50 MG: 10 INJECTION, EMULSION INTRAVENOUS at 09:38

## 2017-07-25 RX ADMIN — SODIUM CHLORIDE, POTASSIUM CHLORIDE, SODIUM LACTATE AND CALCIUM CHLORIDE: 600; 310; 30; 20 INJECTION, SOLUTION INTRAVENOUS at 07:50

## 2017-07-25 RX ADMIN — HYDROMORPHONE HYDROCHLORIDE 0.5 MG: 1 INJECTION, SOLUTION INTRAMUSCULAR; INTRAVENOUS; SUBCUTANEOUS at 11:39

## 2017-07-25 RX ADMIN — HYDROMORPHONE HYDROCHLORIDE 0.3 MG: 1 INJECTION, SOLUTION INTRAMUSCULAR; INTRAVENOUS; SUBCUTANEOUS at 10:25

## 2017-07-25 RX ADMIN — Medication 5 MG: at 07:42

## 2017-07-25 RX ADMIN — CEFAZOLIN SODIUM 1 G: 1 INJECTION, POWDER, FOR SOLUTION INTRAMUSCULAR; INTRAVENOUS at 16:50

## 2017-07-25 RX ADMIN — Medication 5 MG: at 09:16

## 2017-07-25 RX ADMIN — ACETAMINOPHEN 1000 MG: 10 INJECTION, SOLUTION INTRAVENOUS at 10:03

## 2017-07-25 RX ADMIN — BACITRACIN ZINC, NEOMYCIN, POLYMYXIN B: 400; 3.5; 5 OINTMENT TOPICAL at 17:10

## 2017-07-25 RX ADMIN — FENTANYL CITRATE 100 MCG: 50 INJECTION, SOLUTION INTRAMUSCULAR; INTRAVENOUS at 07:27

## 2017-07-25 RX ADMIN — DEXAMETHASONE SODIUM PHOSPHATE 4 MG: 4 INJECTION, SOLUTION INTRA-ARTICULAR; INTRALESIONAL; INTRAMUSCULAR; INTRAVENOUS; SOFT TISSUE at 07:51

## 2017-07-25 RX ADMIN — GLYCOPYRROLATE 0.5 MG: 0.2 INJECTION, SOLUTION INTRAMUSCULAR; INTRAVENOUS at 10:30

## 2017-07-25 RX ADMIN — ROCURONIUM BROMIDE 50 MG: 10 INJECTION INTRAVENOUS at 07:27

## 2017-07-25 RX ADMIN — SODIUM CHLORIDE, POTASSIUM CHLORIDE, SODIUM LACTATE AND CALCIUM CHLORIDE: 600; 310; 30; 20 INJECTION, SOLUTION INTRAVENOUS at 10:28

## 2017-07-25 RX ADMIN — BACITRACIN ZINC, NEOMYCIN, POLYMYXIN B: 400; 3.5; 5 OINTMENT TOPICAL at 20:28

## 2017-07-25 RX ADMIN — VECURONIUM BROMIDE 1 MG: 1 INJECTION, POWDER, LYOPHILIZED, FOR SOLUTION INTRAVENOUS at 09:37

## 2017-07-25 RX ADMIN — VECURONIUM BROMIDE 2 MG: 1 INJECTION, POWDER, LYOPHILIZED, FOR SOLUTION INTRAVENOUS at 08:52

## 2017-07-25 RX ADMIN — Medication 7.5 MG: at 07:33

## 2017-07-25 RX ADMIN — Medication 5 MG: at 07:45

## 2017-07-25 RX ADMIN — FENTANYL CITRATE 50 MCG: 50 INJECTION, SOLUTION INTRAMUSCULAR; INTRAVENOUS at 08:02

## 2017-07-25 RX ADMIN — Medication 5 MG: at 09:51

## 2017-07-25 RX ADMIN — VECURONIUM BROMIDE 2 MG: 1 INJECTION, POWDER, LYOPHILIZED, FOR SOLUTION INTRAVENOUS at 07:57

## 2017-07-25 RX ADMIN — HYDROMORPHONE HYDROCHLORIDE 0.5 MG: 1 INJECTION, SOLUTION INTRAMUSCULAR; INTRAVENOUS; SUBCUTANEOUS at 12:10

## 2017-07-25 RX ADMIN — FENTANYL CITRATE 50 MCG: 50 INJECTION, SOLUTION INTRAMUSCULAR; INTRAVENOUS at 07:53

## 2017-07-25 RX ADMIN — HYDROMORPHONE HYDROCHLORIDE 0.3 MG: 1 INJECTION, SOLUTION INTRAMUSCULAR; INTRAVENOUS; SUBCUTANEOUS at 14:44

## 2017-07-25 RX ADMIN — LIDOCAINE HYDROCHLORIDE 60 MG: 20 INJECTION, SOLUTION INFILTRATION; PERINEURAL at 07:27

## 2017-07-25 RX ADMIN — Medication 5 MG: at 08:54

## 2017-07-25 RX ADMIN — SODIUM CHLORIDE, POTASSIUM CHLORIDE, SODIUM LACTATE AND CALCIUM CHLORIDE: 600; 310; 30; 20 INJECTION, SOLUTION INTRAVENOUS at 07:45

## 2017-07-25 ASSESSMENT — LIFESTYLE VARIABLES: TOBACCO_USE: 1

## 2017-07-25 NOTE — BRIEF OP NOTE
[facility] Brief Operative Note    Pre-operative diagnosis: PROSTATE CANCER    Post-operative diagnosis Same   Procedure: Procedure(s):  ROBOTIC ASSISTED  RADICAL PROSTATECTOMY  - Wound Class: II-Clean Contaminated   Surgeon(s): Surgeon(s) and Role:     * Oswaldo Hargrove MD - Primary     * Stevan Matos MD - Assisting   Estimated blood loss: 75 mL    Specimens:   ID Type Source Tests Collected by Time Destination   A : PROSTATE AND BILATERAL PELVIC LYMPH NODES Tissue Prostate SURGICAL PATHOLOGY EXAM Oswaldo Hargrove MD 7/25/2017 10:21 AM       Findings: See full op note

## 2017-07-25 NOTE — PLAN OF CARE
Problem: Goal Outcome Summary  Goal: Goal Outcome Summary  Outcome: No Change  Pt A/ox4, groggy from anesthesia. VSS. Currently on 2L O2, stating at 96%. C/o of lower abdominal pain, Dilaudid last given at 1210, ice applied as well, no further complaints. Dressings CDI on abdomen. Aj catheter in place and patent, voiding adequately. MARIA ALEJANDRA drain on L lateral abdominal area. PIV infusing @ 100mL/hr. On Clear liquid diet, tolerating well. Pt not up during shift. IS education provided. Adult profile completed. Continue to monitor.

## 2017-07-25 NOTE — IP AVS SNAPSHOT
[address], [address]    [facility] [address]    Phone:  [phone]                                       After Visit Summary   7/25/2017    Arthur Jefferson    MRN: [id number]           After Visit Summary Signature Page     I have received my discharge instructions, and my questions have been answered. I have discussed any challenges I see with this plan with the nurse or doctor.    ..........................................................................................................................................  Patient/Patient Representative Signature      ..........................................................................................................................................  Patient Representative Print Name and Relationship to Patient    ..................................................               ................................................  Date                                            Time    ..........................................................................................................................................  Reviewed by Signature/Title    ...................................................              ..............................................  Date                                                            Time

## 2017-07-25 NOTE — ANESTHESIA POSTPROCEDURE EVALUATION
Patient: Arthur Jefferson    Procedure(s):  ROBOTIC ASSISTED  RADICAL PROSTATECTOMY  - Wound Class: II-Clean Contaminated    Diagnosis:PROSTATE CANCER   Diagnosis Additional Information: No value filed.    Anesthesia Type:  General, ETT    Note:  Anesthesia Post Evaluation    Patient location during evaluation: PACU  Patient participation: Able to fully participate in evaluation  Level of consciousness: awake  Pain management: adequate  Airway patency: patent  Cardiovascular status: acceptable  Respiratory status: acceptable  Hydration status: acceptable  PONV: none     Anesthetic complications: None          Last vitals:  Vitals:    07/25/17 1210 07/25/17 1220 07/25/17 1225   BP: 116/76 103/69    Pulse:      Resp: 12 10    Temp: 35.9  C (96.6  F) 35.9  C (96.6  F)    SpO2: 95% 91% 94%         Electronically Signed By: Ana Oliva  July 25, 2017  12:36 PM

## 2017-07-25 NOTE — ANESTHESIA PREPROCEDURE EVALUATION
Anesthesia Evaluation     . Pt has had prior anesthetic.     No history of anesthetic complications          ROS/MED HX    ENT/Pulmonary:     (+)tobacco use (quit 1 week ago), , . .   (-) sleep apnea   Neurologic:       Cardiovascular:     (+) Dyslipidemia, ----. : . . . :. .       METS/Exercise Tolerance:     Hematologic:         Musculoskeletal:         GI/Hepatic:         Renal/Genitourinary:     (+) chronic renal disease,       Endo:         Psychiatric:         Infectious Disease:         Malignancy:   (+) Malignancy History of Prostate          Other:                     Physical Exam  Normal systems: dental    Airway   Mallampati: I  TM distance: >3 FB  Neck ROM: full    Dental     Cardiovascular   Rhythm and rate: regular      Pulmonary    breath sounds clear to auscultation                    Anesthesia Plan      History & Physical Review  History and physical reviewed and following examination; no interval change.    ASA Status:  2 .        Plan for General and ETT with Intravenous induction. Maintenance will be Balanced.    PONV prophylaxis:  Ondansetron (or other 5HT-3) and Dexamethasone or Solumedrol  Additional equipment: 2nd IV      Postoperative Care  Postoperative pain management:  IV analgesics.      Consents  Anesthetic plan, risks, benefits and alternatives discussed with:  Patient.  Use of blood products discussed: Yes.   Use of blood products discussed with Patient.  Consented to blood products.  .                          .

## 2017-07-25 NOTE — PROGRESS NOTES
Admission medication history interview status for the 7/25/2017  admission is complete. See EPIC admission navigator for prior to admission medications     Medication history source reliability:Good    Medication history interview source(s):Patient    Medication history resources (including written lists, pill bottles, clinic record):None    Primary pharmacy.[facility]    Additional medication history information not noted on PTA med list :None    Time spent in this activity: 40 minutes      Prior to Admission medications    Medication Sig Last Dose Taking? Auth Provider   calcium-magnesium (CALMAG) 500-250 MG TABS per tablet Take 1 tablet by mouth daily Past Week at AM Yes Reported, Patient   multivitamin, therapeutic with minerals (THERA-VIT-M) TABS tablet Take 1 tablet by mouth every other day Past Week at AM Yes Reported, Patient   Ascorbic Acid (VITAMIN C PO) Take 1 tablet by mouth daily Past Week at AM Yes Reported, Patient   VITAMIN E PO Take 1 tablet by mouth every other day 7/11/2017 at AM Yes Reported, Patient

## 2017-07-25 NOTE — IP AVS SNAPSHOT
MRN:[id number]                      After Visit Summary   7/25/2017    Arthur Jefferson    MRN: [id number]           Thank you!     Thank you for choosing [facility] for your care. Our goal is always to provide you with excellent care. Hearing back from our patients is one way we can continue to improve our services. Please take a few minutes to complete the written survey that you may receive in the mail after you visit with us. Thank you!        Patient Information     Date Of Birth          1955        Designated Caregiver       Most Recent Value    Caregiver    Will someone help with your care after discharge? yes    Name of designated caregiver herbert    Phone number of caregiver     Caregiver address [place]      About your hospital stay     You were admitted on:  July 25, 2017 You last received care in the:  [facility] [facility] [address] [facility]    You were discharged on:  July 26, 2017        Reason for your hospital stay       You were in the hospital to have surgery on your prostate and to recover from surgery                  Who to Call     For medical emergencies, please call 911.  For non-urgent questions about your medical care, please call your primary care provider or clinic, [phone]  For questions related to your surgery, please call your surgery clinic        Attending Provider     Provider Specialty    Oswaldo Hargrove MD Urology       Primary Care Provider Office Phone # Fax #    Naty Medeiros [phone]      After Care Instructions     Activity       Avoid strenuous activity, including lifting over 10-15 pounds or straining for a bowel movement, for the next month while you are healing from surgery. No driving while a catheter is in place or if you are taking narcotic pain meds (Percocet, Norco, Oxycodone, Hydrocodone, etc.)            Diet       Be sure to keep yourself well hydrated by drinking plenty of fluids & eat smaller, more  frequent meals while you are healing.Try to eat more healthy foods & avoid junk food.            Discharge Instructions       -You have a casper catheter in place to allow for healing of your urinary system.   You will need to see your urology doctor to have the catheter checked and subsequently removed at your outpatient follow up appointment.  -Do not soak or take a tub bath (no swimming or going in a hot tub) while your catheter remains in place. You may shower with the catheter in. You may use a wet or soapy wash cloth to clean the outside of the catheter and the groin area as needed for hygiene, just make sure to avoid pulling on the catheter. Always secure the catheter to your thigh to prevent pulling.  -Look at the appearance and amount of drainage that comes from the catheter into the bag. Call the [facility] office if the drain falls out, the drainage has bright red blood that does not clear within 48 hours, the drainage has a foul smell, the drainage has pus in it or you have any other concerns.  -For male patients: You may apply a small amount of bacitracin/neosporin ointment to the tip of your penis 2-4 times daily while your catheter is in place to help reduce irritation.    Your incision was closed using surgical staples that will be removed at your follow up appointment. You can leave the stapled incision open to air 24 hours after surgery. You should remove the bandages over your incisions within 48 hours after surgery (if they're left on longer, the bonding of the adhesive to your skin may become firmly attached and remove skin when they're removed, causing a painful sore). There are no specific cares needed aside from keeping the area clean and dry, but you can use an over the counter ointment such as bacitracin or neosporin on the incisions if you prefer. You may shower normally, but avoid scrubbing that area or using harsh soaps. Pat dry after shower. Do not soak in the tub, swim, or  otherwise submerge your surgical site in water until your staples have been removed and your incision is well healed.    The MARIA ALEJANDRA drain site will ooze for the first 24-48 hours after the drain is removed. You should keep the dressing over the site until it scabs over and you should keep the area dry during this time. After 24-48 hours, you can remove the dressing and leave it open to air. After 24-48 hours, you can get the site wet, but you should avoid submerging it in water (no tub baths, no swimming, etc) until it is well-healed (2-4 weeks).     You should expect some gas and bloating discomfort in your abdomen after surgery, particularly for the first week. This is normal. To help manage this, you should eat small light meals, take frequent short walks, and use as little narcotic pain medicine as you can to maintain a comfortable pain level,    You may resume your over the counter aspirin, NSAIDS (ibuprofen, naproxen, aleve, etc.), and vitamins/dietary supplements 24 hours after your catheter has been removed.    You are being prescribed a narcotic pain medication. This is a strong pain medication that should be used to keep your pain to a tolerable level, but no more than necessary as it can be addictive and has side effects. With the narcotic pain medicine, you might experience some constipation, so you may want to take an over the counter stool softener like dulcolax or a fiber supplement such as Metamucil or Benefiber to keep your bowel movements soft and regular. You should also be very careful with driving, as the pain medicine may dull your senses and reflexes. Avoid alcohol, as this can unexpectedly increase the effect of the pain medicine.    If there are any problems that concern you enough to visit an emergency room in the next month or two, please return to the hospital you had surgery at, as the staff at that hospital will be familiar with you, your procedure, and your surgeon as well as have access  "to all of your records to make sure your surgical site is healing well.                  Follow-up Appointments     Follow-up and recommended labs and tests        Please follow up at [facility] on Thursday 8/3/17.  You are scheduled for a CT cystogram at 9am to evaluate how well you have healed from surgery.  Then meet with Dr. Hargrove at 9:50am for a routine postoperative check and to have your surgical staples and urinary catheter removed    [facility]  [address] [place] [address], [address]  [place]. [address]    You may call [phone] with any questions or concerns.                  Pending Results     No orders found for last 3 day(s).            Statement of Approval     Ordered          07/26/17 4780  I have reviewed and agree with all the recommendations and orders detailed in this document.  EFFECTIVE NOW     Approved and electronically signed by:  Odalis Graham PA-C             Admission Information     Date & Time Provider Department Dept. Phone    7/25/2017 Oswaldo Hargrove MD [place] [address] [facility] [phone]      Your Vitals Were     Blood Pressure Pulse Temperature Respirations Height Weight    129/78 (BP Location: Right arm) 70 96  F (35.6  C) (Oral) 18 1.664 m (5' 5.5\") 70.8 kg (156 lb)    Pulse Oximetry BMI (Body Mass Index)                97% 25.56 kg/m2          [employer] Information     [employer] lets you send messages to your doctor, view your test results, renew your prescriptions, schedule appointments and more. To sign up, go to [email or web address] . Click on \"Log in\" on the left side of the screen, which will take you to the Welcome page. Then click on \"Sign up Now\" on the right side of the page.     You will be asked to enter the access code listed below, as well as some personal information. Please follow the directions to create your username and password.     Your access code is: [email or web address]  Expires: 9/14/2017  8:25 AM     Your access code " will  in 90 days. If you need help or a new code, please call your [facility] clinic or [phone].        Care EveryWhere ID     This is your Care EveryWhere ID. This could be used by other organizations to access your [facility] medical records  [id number]        Equal Access to Services     JASON NUÑEZ : Hadii aad ku hadchriso Soomaali, waaxda luqadaha, qaybta kaalmada adeegyada, kemal martinesbrendaluciano paz. So [facility] [phone].    ATENCIÓN: Si habla español, tiene a hidalgo disposición servicios gratuitos de asistencia lingüística. Tanisha al [phone].    We comply with applicable federal civil rights laws and [place] laws. We do not discriminate on the basis of race, color, national origin, age, disability sex, sexual orientation or gender identity.               Review of your medicines      START taking        Dose / Directions    oxyCODONE-acetaminophen 5-325 MG per tablet   Commonly known as:  PERCOCET   Used for:  Prostate cancer (H)        Dose:  1-2 tablet   Take 1-2 tablets by mouth every 4 hours as needed for moderate to severe pain   Quantity:  25 tablet   Refills:  0         CONTINUE these medicines which have NOT CHANGED        Dose / Directions    calcium-magnesium 500-250 MG Tabs per tablet   Commonly known as:  CALMAG        Dose:  1 tablet   Take 1 tablet by mouth daily   Refills:  0       multivitamin, therapeutic with minerals Tabs tablet        Dose:  1 tablet   Take 1 tablet by mouth every other day   Refills:  0       VITAMIN C PO        Dose:  1 tablet   Take 1 tablet by mouth daily   Refills:  0       VITAMIN E PO        Dose:  1 tablet   Take 1 tablet by mouth every other day   Refills:  0            Where to get your medicines      Some of these will need a paper prescription and others can be bought over the counter. Ask your nurse if you have questions.     Bring a paper prescription for each of these medications     oxyCODONE-acetaminophen 5-325 MG per tablet                 Protect others around you: Learn how to safely use, store and throw away your medicines at www.disposemymeds.org.             Medication List: This is a list of all your medications and when to take them. Check marks below indicate your daily home schedule. Keep this list as a reference.      Medications           Morning Afternoon Evening Bedtime As Needed    calcium-magnesium 500-250 MG Tabs per tablet   Commonly known as:  CALMAG   Take 1 tablet by mouth daily                                multivitamin, therapeutic with minerals Tabs tablet   Take 1 tablet by mouth every other day                                oxyCODONE-acetaminophen 5-325 MG per tablet   Commonly known as:  PERCOCET   Take 1-2 tablets by mouth every 4 hours as needed for moderate to severe pain   Last time this was given:  1 tablet on 7/26/2017 11:49 AM                                VITAMIN C PO   Take 1 tablet by mouth daily                                VITAMIN E PO   Take 1 tablet by mouth every other day

## 2017-07-25 NOTE — PROCEDURES
Post-operative Note    Preoperative Diagnosis:  Prostate Cancer  Postoperative Diagnosis:  Prostate Cancer    Procedure:  Robot Assisted Radical Prostatectomy with pelvic lymphadenectomy - bilateral       Surgeon(s):  Oswaldo Hargrove MD Pinkhasov, Genri, MD - assisting  Date of Procedure: 7/25/2017    PATHOLOGY:  Adenocarcinoma, Mitchell grade 7 - diffuse    NEOADJUVANT TREATMENT:   Hormones:  yes           POTENCY SPARING:  none  RISK FACTORS:   Body mass index is 25.56 kg/(m^2).       PORT PLACEMENT:  Standard 6 ports     After discussing all treatment options, the patient elected to proceed with robotic prostatectomy.  The patient understands that we will proceed with robotic prostatectomy, but will convert to open prostatectomy if needed.    DESCRIPTION OF PROCEDURE:    The patient was identified and was brougt to the operating room.  Hewas placed on a Gelfoam padded table.  After adequate general anesthesia, he was placed in low stirrups.  Pneumatic compression stockings had been applied.  All the pressure points were adequately padded.  Shoulder braces were used, these were appropriately positioned not to cause any pressure.  The patient was then prepped and draped in the usual manner.  Time out was called.  An 18 Macedonian casper catheter was placed with in the sterile field and the bladder was emptied.  I made a small supra umbilical incision.  Stay sutures were placed on the fascia.  Using a Veress needle, pneumoperitoneum was achieved.  I then placed a 12 mm port at this site.  Initial endoscopic inspection with a 0 degree lens showed findings as noted above.  The remaining ports were then placed.  Two 8 mm ports were placed on either side 10 cm from the umbilical port.  A 12 mm port was placed in the right lower quadrant above the anterior superior iliac spine, and a similar location on the left side an 8 mm port was placed.  A 8 mm airseal/suction port was placed lateral to the camera port on the right  side.  With all the ports in place the patient was placed in steep trendelenburg position and the robot was docked.    I performed the robotic prostatectomy in my standard fashion.  4 arms were used.  The surgery was performed using a 0 degree lens.  Guarded scissors in arm 1, PK coagulator in arm 2 and prograsp in arm 3.  I first incised the peritonium at the bladder base.  The seminal vesicles and vas deferens were identified and dissected free and the plane between the prostate and rectum was identified and seperated as distal and lateral as possible.  Next peritoneal incisions were made lateral to the medial umbilical ligaments and the bladder was dissected away from the anterior abdominal wall and pubic ramus to expose the prostate.  The superficial dorsal vein was cauterized and transected.  The endopelvic fascia was opened.  I then proceeded to dissect the tissue away from the apex of the prostate down to the deep dorsal vein complex.  I then placed a 0 Vicryl suture on a CT-1 needle to ligate the deep dorsal complex.      Next the anterior bladder wall was incised at the level of the bladder neck.  The casper catheter was extracted from the bladder and placed to traction using the third arm.  The posterior bladder neck was then excised and dissected away from the base of the prostate.  The previously dissected seminal vesicles and vas deferens were now brought into view.  These structures were then tented up.  The plane between the prostate and the rectum was further dissected up to the apex of the prostate.  This exposed the lateral pedicles.      I then proceeded to identify the plane between the lateral prostate and the lateral pedicles. Hem-O-Jada clips/ Enseal/ Vessel sealer was/ were used and the lateral pedicle was/were transected.  Electro Cautery was used in this area.  Nerve preservation was not performed.  The dissection was carried around the apex of the prostate.  Next  I transected the deep  dorsal vein and further dissection was carried around the apex of the prostate.  The urethra was transected distal to the apex of the prostate and the prostate was placed in am Endopouch.  Eddie-Seal was used to achieve further hemostasis.    Bilateral pelvic lymphadenectomy was performed in a standard fashion.     Next, I proceeded to anastomosis the bladder neck to the urethra using a running suture of 3-0 Monocryl on a double armed needle.  A two layer anastomosis was performed posteriorly.  A 20 Italian casper catheter was placed and the bladder irrigated well.  A good watertight , tension free anastasmosis was obtained.  Further inspection at this time showed no further bleeding.  The string of the Endopouch was then brought out through the umbilical port site.   The robot was then docked away.  A 15 Italian round Wes Ramirez was placed through the left lower quadrant port and all the laparoscopic ports were removed.  I slightly extended the supraumbilical incision and the prostate was removed through this site.  The fascia was then closed using Vicryl sutures.  The skin incisions were closed using staples.  The Wes Ramirez was secured to the skin with a silk stitch.  The needle, sponge and lap counts were correct.  The patient remained stable throughout the entire procedure.  The patient tolerated the procedure well and was sent to the recovery room in stable condition.

## 2017-07-25 NOTE — ANESTHESIA CARE TRANSFER NOTE
Patient: Arthur Jefferson    Procedure(s):  ROBOTIC ASSISTED  RADICAL PROSTATECTOMY  - Wound Class: II-Clean Contaminated    Diagnosis: PROSTATE CANCER   Diagnosis Additional Information: No value filed.    Anesthesia Type:   General, ETT     Note:  Airway :Face Mask  Patient transferred to:PACU  Comments: Patient to PACU, 10L o2 via face mask, exchanging well, VSS; Stable transfer of care, report to RN.        Vitals: (Last set prior to Anesthesia Care Transfer)    CRNA VITALS  7/25/2017 1020 - 7/25/2017 1057      7/25/2017             EKG: Sinus rhythm                Electronically Signed By: MARILUZ Carreon CRNA  July 25, 2017  10:57 AM

## 2017-07-26 VITALS
HEART RATE: 70 BPM | SYSTOLIC BLOOD PRESSURE: 129 MMHG | HEIGHT: 66 IN | BODY MASS INDEX: 25.07 KG/M2 | WEIGHT: 156 LBS | TEMPERATURE: 96 F | RESPIRATION RATE: 18 BRPM | DIASTOLIC BLOOD PRESSURE: 78 MMHG | OXYGEN SATURATION: 97 %

## 2017-07-26 LAB
BASOPHILS # BLD AUTO: 0 10E9/L (ref 0–0.2)
BASOPHILS NFR BLD AUTO: 0 %
COPATH REPORT: NORMAL
DIFFERENTIAL METHOD BLD: ABNORMAL
EOSINOPHIL # BLD AUTO: 0.1 10E9/L (ref 0–0.7)
EOSINOPHIL NFR BLD AUTO: 0.6 %
ERYTHROCYTE [DISTWIDTH] IN BLOOD BY AUTOMATED COUNT: 13.6 % (ref 10–15)
HCT VFR BLD AUTO: 34.2 % (ref 40–53)
HGB BLD-MCNC: 11.8 G/DL (ref 13.3–17.7)
IMM GRANULOCYTES # BLD: 0 10E9/L (ref 0–0.4)
IMM GRANULOCYTES NFR BLD: 0.2 %
LYMPHOCYTES # BLD AUTO: 1.1 10E9/L (ref 0.8–5.3)
LYMPHOCYTES NFR BLD AUTO: 12.6 %
MCH RBC QN AUTO: 30.8 PG (ref 26.5–33)
MCHC RBC AUTO-ENTMCNC: 34.5 G/DL (ref 31.5–36.5)
MCV RBC AUTO: 89 FL (ref 78–100)
MONOCYTES # BLD AUTO: 0.6 10E9/L (ref 0–1.3)
MONOCYTES NFR BLD AUTO: 6.9 %
NEUTROPHILS # BLD AUTO: 6.7 10E9/L (ref 1.6–8.3)
NEUTROPHILS NFR BLD AUTO: 79.7 %
NRBC # BLD AUTO: 0 10*3/UL
NRBC BLD AUTO-RTO: 0 /100
PLATELET # BLD AUTO: 206 10E9/L (ref 150–450)
RBC # BLD AUTO: 3.83 10E12/L (ref 4.4–5.9)
WBC # BLD AUTO: 8.4 10E9/L (ref 4–11)

## 2017-07-26 PROCEDURE — 25000128 H RX IP 250 OP 636: Performed by: UROLOGY

## 2017-07-26 PROCEDURE — 25000132 ZZH RX MED GY IP 250 OP 250 PS 637: Performed by: UROLOGY

## 2017-07-26 PROCEDURE — 85025 COMPLETE CBC W/AUTO DIFF WBC: CPT | Performed by: UROLOGY

## 2017-07-26 PROCEDURE — 36415 COLL VENOUS BLD VENIPUNCTURE: CPT | Performed by: UROLOGY

## 2017-07-26 RX ADMIN — SODIUM CHLORIDE, POTASSIUM CHLORIDE, SODIUM LACTATE AND CALCIUM CHLORIDE: 600; 310; 30; 20 INJECTION, SOLUTION INTRAVENOUS at 03:45

## 2017-07-26 RX ADMIN — BACITRACIN ZINC, NEOMYCIN, POLYMYXIN B: 400; 3.5; 5 OINTMENT TOPICAL at 08:52

## 2017-07-26 RX ADMIN — OXYCODONE HYDROCHLORIDE AND ACETAMINOPHEN 2 TABLET: 5; 325 TABLET ORAL at 01:02

## 2017-07-26 RX ADMIN — CEFAZOLIN SODIUM 1 G: 1 INJECTION, POWDER, FOR SOLUTION INTRAMUSCULAR; INTRAVENOUS at 01:17

## 2017-07-26 RX ADMIN — OXYCODONE HYDROCHLORIDE AND ACETAMINOPHEN 1 TABLET: 5; 325 TABLET ORAL at 11:49

## 2017-07-26 RX ADMIN — Medication 2 TABLET: at 08:51

## 2017-07-26 NOTE — PLAN OF CARE
Problem: Goal Outcome Summary  Goal: Goal Outcome Summary  Outcome: Improving  A/ox4. VSS. C/o of lower abdominal/pelvic pain, PRN oxycodone given x1, provided relief. Diet advanced to regular this shift, tolerating well. No BM this shift, not passing gas. Aj patent, adequate output. MARIA ALEJANDRA drain removed, 4x4 dressing applied. Continuous fluids stopped. PIV in L hand SL. Walked in hallway x3. Aj catheter/ leg bag education provided with video and demonstration, pts questions answered. Discharge orders in, reviewed AVS packet with pt. Oxycodone script sent down to discharge pharmacy to be filled. L PIV still needs to be removed. Pt unable to leave until wife gets off work around [id number].

## 2017-07-26 NOTE — PROGRESS NOTES
"Urology POD #1  S/P Robot Assisted Radical Prostatectomy with pelvic lymphadenectomy - bilateral    Subjective:  Pt found resting in bed. Pt hasn't passed gas yet since surgery, but has been tolerating clears without nausea or vomiting. Pt feels hungry. Pt's pain is well-controlled, mainly having some gas pain. Pt tolerating the catheter ok. Pt has ambulated since surgery and that went well. Pt tolerating the catheter ok.     Objective:  /68 (BP Location: Right arm)  Pulse 70  Temp 97.9  F (36.6  C) (Oral)  Resp 16  Ht 1.664 m (5' 5.5\")  Wt 70.8 kg (156 lb)  SpO2 96%  BMI 25.56 kg/m2    Intake/Output Summary (Last 24 hours) at 07/26/17 1018  Last data filed at 07/26/17 0629   Gross per 24 hour   Intake             1653 ml   Output             2850 ml   Net            -1197 ml   MARIA ALEJANDRA: 75cc since surgery    Labs:  ROUTINE IP LABS (Last four results)  BMP  Recent Labs  Lab 07/21/17  0852      POTASSIUM 4.5   CHLORIDE 108   EFREM 9.5   CO2 27   BUN 18   CR 0.88   *     CBC  Recent Labs  Lab 07/26/17  0730 07/21/17  0853   WBC 8.4 5.3   RBC 3.83* 4.43   HGB 11.8* 13.6   HCT 34.2* 40.0   MCV 89 90   MCH 30.8 30.7   MCHC 34.5 34.0   RDW 13.6 14.0    233     Exam:  Gen: Alert and oriented, cooperative, NAD  Ab:+BS, soft, rounded, NTTP, 6 small incisions well-approximated with staples without erythema, edema, or discharge. MARIA ALEJANDRA in LLQ draining serosanguinous fluid  : Catheter in place draining nato urine.  Ext: Calves soft, nttp, no edema    Assessment/Plan:  S/P Robot Assisted Radical Prostatectomy with pelvic lymphadenectomy - bilateral   -Encourage ambulation, IS use   -Percocet for pain   -Regular diet, but encourage small portions   -Continue catheter, cath teaching prior to D/C   -Plan for D/C this PM with cath in place, remove MARIA ALEJANDRA prior to D/C    Discussed exam, lab work and plan with Dr. Hargrove  Please contact me with any questions or concerns.     Odalis Graham PA-C  [facility]  Pager:  [phone]  After 4pm and on weekends, please call [phone]

## 2017-07-26 NOTE — PLAN OF CARE
Pt was provided education regarding discharge instructions, casper cares, and new medications. Pt demonstrated understanding of new medications, discharge instructions and casper cares. Pt demonstrated casper cares with aseptic technique. All questions were answered by RN. Pt PIV was removed. New medications from pharmacy were given to pt by RN. VSS. Pt left hospital in wheelchair with wife and RN around 1630.

## 2017-07-26 NOTE — PLAN OF CARE
Problem: Goal Outcome Summary  Goal: Goal Outcome Summary  Outcome: No Change   Pt A/ox4. VSS. 96% RA. C/o of lower abdominal pain, Dilaudid given x1, effective for pain management. Dressings to abdomen and MARIA ALEJANDRA drsg site CDI. Aj catheter in place and patent, voiding adequately. MARIA ALEJANDRA drain on L lateral abdominal area, drsg CDI. PIV infusing @ LR 100mL/hr. On Clear liquid diet, tolerating well. Up with Ax1. Pt ambulated x1, tolerated well. Pt encouraged to use IS.  Will continue to monitor.

## 2017-07-26 NOTE — PROGRESS NOTES
Please send the letter to the patient with the following.         Here are your results for your recent labs. Your labs were reassuring. Please call or message me if you have questions or concerns.

## 2017-07-26 NOTE — PLAN OF CARE
Problem: Goal Outcome Summary  Goal: Goal Outcome Summary  Outcome: Improving  Pt A/ox4. VSS on RA. C/o of lower pelvic pain, oxy given x1 with relief. Dressings to abdomen and MARIA ALEJANDRA drsg site CDI. Aj catheter in place and patent, voiding adequately. Clear liquid diet, tolerating well. Up SBA, tolerated well. Plan: Continue to administer abx and pain meds as needed.

## 2017-07-27 ENCOUNTER — TELEPHONE (OUTPATIENT)
Dept: FAMILY MEDICINE | Facility: CLINIC | Age: 62
End: 2017-07-27

## 2017-07-28 NOTE — TELEPHONE ENCOUNTER
"Hospital/TCU/ED for chronic condition Discharge Protocol    \"Hi, my name is Evan Cabral, a registered nurse, and I am calling from [facility].  I am calling to follow up and see how things are going for you after your recent emergency visit/hospital/TCU stay.\"    Tell me how you are doing now that you are home?\" feels constipated. Is passing gas. Reviewed d/c instructions and constipation info. Also discussed using sennakot.      Discharge Instructions    \"Let's review your discharge instructions.  What is/are the follow-up recommendations?  Pt. Response: f/u with urology.    \"Has an appointment with your primary care provider been scheduled?\"   Seeing urology.  \"When you see the provider, I would recommend that you bring your medications with you.\"    Medications    \"Tell me what changed about your medicines when you discharged?\"    Changes to chronic meds?    0-1    \"What questions do you have about your medications?\"    None     New diagnoses of heart failure, COPD, diabetes, or MI?    No                  Medication reconciliation completed? Yes  Was MTM referral placed (*Make sure to put transitions as reason for referral)?   No    Call Summary    \"What questions or concerns do you have about your recent visit and your follow-up care?\"     Discussed purpose of call and constipation concerns.    \"If you have questions or things don't continue to improve, we encourage you contact us through the main clinic number (give number).  Even if the clinic is not open, triage nurses are available 24/7 to help you.     We would like you to know that our clinic has extended hours (provide information).  We also have urgent care (provide details on closest location and hours/contact info)\"      \"Thank you for your time and take care!\"  LYSSA aCstellanos         "

## 2017-07-31 NOTE — DISCHARGE SUMMARY
Urology Discharge Summary:     Admit date: 7/25/2017  Discharge date: 7/26/2017    Diagnosis: Prostate cancer  Procedure: Robot Assisted Radical Prostatectomy with pelvic lymphadenectomy - bilateral    Hospital course:  Pt admitted for a planned Robot Assisted Radical Prostatectomy with pelvic lymphadenectomy - bilateral with Dr. Hargrove for known prostate cancer.  Pt tolerated the procedure well with minimal blood loss and was transferred to the floor in stable condition; MARIA ALEJANDRA drain and catheter in place.  Postoperative analgesia was achieved with IV Dilaudid, pt was transitioned to oral pain medication on POD 1, which was well tolerated.  Pt remained hemodynamically stable throughout postoperative recovery.  Diet was gradually resumed with the return of bowel function which pt tolerated without nausea or vomiting.  Activity was gradually increased until pt resumed independence with transfers / ambulation.    Pt discharged home POD #1 in stable condition, pain well controlled, ambulating well and follow up care arranged. MARIA ALEJANDRA drain was removed with casper catheter transitioned to leg bag and pt / family instructed on home cares prior to discharge.     Labs:  Recent Labs   Lab Test  07/26/17   0730  07/21/17   0853   WBC  8.4  5.3   RBC  3.83*  4.43   HGB  11.8*  13.6   HCT  34.2*  40.0   MCV  89  90   MCH  30.8  30.7   MCHC  34.5  34.0   PLT  206  233     Recent Labs   Lab Test  07/21/17   0852  12/01/16   0933  09/05/14   2245   POTASSIUM  4.5  4.6  3.6   CHLORIDE  108  107  104   BUN  18  14  18     Pathology:  SPECIMEN(S):   Prostate and bilateral pelvic lymph nodes     FINAL DIAGNOSIS:     Specimen        Procedure:    Radical prostatectomy- Robotic assisted        Prostate Size:   See gross        Prostate Weight: 33 g        Lymph Node Sampling: Bilateral pelvic lymph node dissection   performed     Tumor        Histologic Type:   Adenocarcinoma (acinar, not otherwise specified)        [place] Pattern:              Primary Pattern:   Grade 4             Secondary Pattern:   Grade 3             Tertiary Pattern:   Not applicable             Total [place] Score: 7     Extent        Tumor Quantitation             Proportion (p%) of prostatic tissue involved by tumor: 35%             Tumor Size (dominant nodule, if present):   Greatest   dimension: At least 3 cm with tumor extending from prostatic apex the   bladder neck        Extraprostatic Extension:   Present: Focal.  Focally present in   area of right anterior prostate and right mid prostate        Seminal Vesicle Invasion:   Present - Right seminal vesicle only     Margins:   Margins uninvolved by invasive carcinoma- Both the right and   left bladder neck margin show fairly extensive involvement by grade 4   adenocarcinoma over an area measuring approximately 5 mm on either side        Focality of Tumor:    Unifocal     Accessory Findings        Treatment Effect on Carcinoma:   Not identified        Lymph-Vascular Invasion:   Not identified        Perineural Invasion:   Present- Extensive     Pathologic Staging (pTNM)        TNM Descriptors:    -        Primary Tumor (pT):    pT3b:  Seminal vesicle invasion        Regional Lymph Nodes (pN):   pN0: No regional lymph node metastasis             Number of Lymph Nodes Examined: Three left pelvic lymph nodes   and five right pelvic lymph nodes             Number of Lymph Nodes Involved:   None identified     COMMENT:   This is a bilateral peripheral zone adenocarcinoma the prostate which   involves approximately 35% of the prostate with microscopic extension   into the bladder neck and also with extension into the right seminal   vesicle.  Focal microscopic extra prostatic extension is also seen in   the right lobe of the prostate. The bladder neck margins are positive   for grade 4 prostatic adenocarcinoma over approximately 5 mm in both the   right and left bladder neck.  The lymph nodes are all negative.     Electronically  "signed out by:     Marquez Méndez M.D.     GROSS:        The specimen is received in formalin and labeled\" prostate and   bilateral pelvic lymph nodes\" with the patient's name and proper   identification.  The specimen consists of 33 gram prostate (4.1 cm from   right to left, 3.4 cm from anterior to posterior and 3.2 cm from   superior to inferior) with attached right seminal vesicle (2.5 x 1.2 x   1.2 cm) and left seminal vesicle (3.1 x 1.2 x 0.8 cm).  The specimen is   oriented and inked blue for right and black for left.  The apex and   bladder neck are coned.  The remaining specimen is serially sectioned   from inferior to superior.  On section, the specimen has a pink-tan   fibromuscular center throughout.  There is no evidence of grossly   obvious tumor identified.  Also in the container are two yellow fatty   nodes(2.5 x 1.6 x 0.5 cm and 4.5 x 2.4 x 1.0 cm).  The latter specimen     has a clip marking the left pelvic lymph node.  Representative sections   are submitted     MICROSCOPIC:     Microscopic performed     CPT Codes:   A: 19651-HK3     Discharge Orders     Reason for your hospital stay   You were in the hospital to have surgery on your prostate and to recover from surgery     Follow-up and recommended labs and tests    Please follow up at [facility] on Thursday 8/3/17.  You are scheduled for a CT cystogram at 9am to evaluate how well you have healed from surgery.  Then meet with Dr. Hargrove at 9:50am for a routine postoperative check and to have your surgical staples and urinary catheter removed    [facility]  [address] [place] [address], [address]  [place]. [address]    You may call [phone] with any questions or concerns.     Activity   Avoid strenuous activity, including lifting over 10-15 pounds or straining for a bowel movement, for the next month while you are healing from surgery. No driving while a catheter is in place or if you are taking narcotic pain meds " (Percocet, Norco, Oxycodone, Hydrocodone, etc.)     Discharge Instructions   -You have a casper catheter in place to allow for healing of your urinary system.   You will need to see your urology doctor to have the catheter checked and subsequently removed at your outpatient follow up appointment.  -Do not soak or take a tub bath (no swimming or going in a hot tub) while your catheter remains in place. You may shower with the catheter in. You may use a wet or soapy wash cloth to clean the outside of the catheter and the groin area as needed for hygiene, just make sure to avoid pulling on the catheter. Always secure the catheter to your thigh to prevent pulling.  -Look at the appearance and amount of drainage that comes from the catheter into the bag. Call the [facility] office if the drain falls out, the drainage has bright red blood that does not clear within 48 hours, the drainage has a foul smell, the drainage has pus in it or you have any other concerns.  -For male patients: You may apply a small amount of bacitracin/neosporin ointment to the tip of your penis 2-4 times daily while your catheter is in place to help reduce irritation.    Your incision was closed using surgical staples that will be removed at your follow up appointment. You can leave the stapled incision open to air 24 hours after surgery. You should remove the bandages over your incisions within 48 hours after surgery (if they're left on longer, the bonding of the adhesive to your skin may become firmly attached and remove skin when they're removed, causing a painful sore). There are no specific cares needed aside from keeping the area clean and dry, but you can use an over the counter ointment such as bacitracin or neosporin on the incisions if you prefer. You may shower normally, but avoid scrubbing that area or using harsh soaps. Pat dry after shower. Do not soak in the tub, swim, or otherwise submerge your surgical site in water until  your staples have been removed and your incision is well healed.    The MARIA ALEJANDRA drain site will ooze for the first 24-48 hours after the drain is removed. You should keep the dressing over the site until it scabs over and you should keep the area dry during this time. After 24-48 hours, you can remove the dressing and leave it open to air. After 24-48 hours, you can get the site wet, but you should avoid submerging it in water (no tub baths, no swimming, etc) until it is well-healed (2-4 weeks).     You should expect some gas and bloating discomfort in your abdomen after surgery, particularly for the first week. This is normal. To help manage this, you should eat small light meals, take frequent short walks, and use as little narcotic pain medicine as you can to maintain a comfortable pain level,    You may resume your over the counter aspirin, NSAIDS (ibuprofen, naproxen, aleve, etc.), and vitamins/dietary supplements 24 hours after your catheter has been removed.    You are being prescribed a narcotic pain medication. This is a strong pain medication that should be used to keep your pain to a tolerable level, but no more than necessary as it can be addictive and has side effects. With the narcotic pain medicine, you might experience some constipation, so you may want to take an over the counter stool softener like dulcolax or a fiber supplement such as Metamucil or Benefiber to keep your bowel movements soft and regular. You should also be very careful with driving, as the pain medicine may dull your senses and reflexes. Avoid alcohol, as this can unexpectedly increase the effect of the pain medicine.    If there are any problems that concern you enough to visit an emergency room in the next month or two, please return to the hospital you had surgery at, as the staff at that hospital will be familiar with you, your procedure, and your surgeon as well as have access to all of your records to make sure your surgical site  is healing well.     Diet   Be sure to keep yourself well hydrated by drinking plenty of fluids & eat smaller, more frequent meals while you are healing.Try to eat more healthy foods & avoid junk food.       Discharge Medications   Discharge Medication List as of 7/26/2017  2:37 PM      START taking these medications    Details   oxyCODONE-acetaminophen (PERCOCET) 5-325 MG per tablet Take 1-2 tablets by mouth every 4 hours as needed for moderate to severe pain, Disp-25 tablet, R-0, Local Print         CONTINUE these medications which have NOT CHANGED    Details   calcium-magnesium (CALMAG) 500-250 MG TABS per tablet Take 1 tablet by mouth daily, Historical      multivitamin, therapeutic with minerals (THERA-VIT-M) TABS tablet Take 1 tablet by mouth every other day, Historical      Ascorbic Acid (VITAMIN C PO) Take 1 tablet by mouth daily, Historical      VITAMIN E PO Take 1 tablet by mouth every other day, Historical           Odalis Graham PA-C   Physician Assistant for [facility]

## 2017-08-03 ENCOUNTER — TRANSFERRED RECORDS (OUTPATIENT)
Dept: HEALTH INFORMATION MANAGEMENT | Facility: CLINIC | Age: 62
End: 2017-08-03

## 2017-09-22 ENCOUNTER — OFFICE VISIT (OUTPATIENT)
Dept: DERMATOLOGY | Facility: CLINIC | Age: 62
End: 2017-09-22
Payer: COMMERCIAL

## 2017-09-22 VITALS
SYSTOLIC BLOOD PRESSURE: 115 MMHG | RESPIRATION RATE: 18 BRPM | HEART RATE: 61 BPM | OXYGEN SATURATION: 99 % | DIASTOLIC BLOOD PRESSURE: 81 MMHG

## 2017-09-22 DIAGNOSIS — L82.1 SEBORRHEIC KERATOSIS: ICD-10-CM

## 2017-09-22 DIAGNOSIS — D48.5 NEOPLASM OF UNCERTAIN BEHAVIOR OF SKIN: Primary | ICD-10-CM

## 2017-09-22 DIAGNOSIS — L81.4 LENTIGO: ICD-10-CM

## 2017-09-22 DIAGNOSIS — D18.00 ANGIOMA: ICD-10-CM

## 2017-09-22 DIAGNOSIS — D22.9 NEVUS: ICD-10-CM

## 2017-09-22 PROCEDURE — 88305 TISSUE EXAM BY PATHOLOGIST: CPT | Performed by: PHYSICIAN ASSISTANT

## 2017-09-22 PROCEDURE — 11100 HC BIOPSY SKIN/SUBQ/MUC MEM, SINGLE LESION: CPT | Performed by: PHYSICIAN ASSISTANT

## 2017-09-22 PROCEDURE — 99204 OFFICE O/P NEW MOD 45 MIN: CPT | Mod: 25 | Performed by: PHYSICIAN ASSISTANT

## 2017-09-22 PROCEDURE — 11101 HC BIOPSY SKIN/SUBQ/MUC MEM, EACH ADDTL LESION: CPT | Performed by: PHYSICIAN ASSISTANT

## 2017-09-22 NOTE — PROGRESS NOTES
HPI:   Arthur Jefferson is a 62 year old male who presents for Full skin cancer screening.  chief complaint  Last Skin Exam: n/a      1st Baseline: yes  Personal HX of Skin Cancer: no   Personal HX of Malignant Melanoma: no   Family HX of Skin Cancer / Malignant Melanoma: no  Personal HX of Atypical Moles:   no  Risk factors: frequent sun exposure; frequent burns as a child  New / Changing lesions: yes area on the scalp, brown spots on the back  Social History: just had surgery for prostate CA  On review of systems, there are no further skin complaints, patient is feeling otherwise well.  See patient intake sheet.  ROS of the following were done and are negative: Constitutional, Eyes, Ears, Nose,   Mouth, Throat, Cardiovascular, Respiratory, GI, Genitourinary, Musculoskeletal,   Psychiatric, Endocrine, Allergic/Immunologic.    PHYSICAL EXAM:   Weight:  BP:   Skin exam performed as follows: Type 2 skin. Mood appropriate  Alert and Oriented X 3. Well developed, well nourished in no distress.  General appearance: Normal  Head including face: Normal  Eyes: conjunctiva and lids: Normal  Mouth: Lips, teeth, gums: Normal  Neck: Normal  Chest-breast/axillae: Normal  Back: Normal  Spleen and liver: Normal  Cardiovascular: Exam of peripheral vascular system by observation for swelling, varicosities, edema: Normal  Genitalia: groin, buttocks: Normal  Extremities: digits/nails (clubbing): Normal  Eccrine and Apocrine glands: Normal  Right upper extremity: Normal  Left upper extremity: Normal  Right lower extremity: Normal  Left lower extremity: Normal  Skin: Scalp and body hair: See below    Pt deferred exam of breasts, groin, buttocks: No    Other physical findings:  1. Multiple pigmented macules on extremities and trunk  2. Multiple pigmented macules on face, trunk and extremities  3. Multiple vascular papules on trunk, arms and legs  4. Multiple scattered keratotic plaques  5. Left posterior shoulder 5 mm pink macule   6.  Right vertex scalp 14 mm pink plaque  7. 15 mm pink patch on right posterior shoulder       Except as noted above, no other signs of skin cancer or melanoma.     ASSESSMENT/PLAN:   Benign Full skin cancer screening today. . Patient with history of none  Advised on monthly self exams and 6 months  Patient Education: Appropriate brochures given.    Multiple benign appearing nevi on arms, legs and trunk. Discussed ABCDEs of melanoma and sunscreen.   Multiple lentigos on arms, legs and trunk. Advised benign, no treatment needed.  Multiple scattered angiomas. Advised benign, no treatment needed.   Seborrheic keratosis on arms, legs and trunk. Advised benign, no treatment needed.  R/o BCC on left posterior shoulder, right vertex scalp, right posterior shoulder. Shave bx in typical fashion .  Area cleaned with betadyne and anesthetized with 1% lidocaine with epi .  Dermablade used to remove the lesion and sent to pathology. Bleeding was cauterized. Pt tolerated procedure well.        Follow-up: pending path Q 6 month FSE/PRN sooner    1.) Patient was asked about new and changing moles. YES  2.) Patient received a complete physical skin examination: YES  3.) Patient was counseled to perform a monthly self skin examination: YES  Scribed By: Vi Woodard, MS, PA-C

## 2017-09-22 NOTE — PATIENT INSTRUCTIONS
Wound Care Instructions     FOR SUPERFICIAL WOUNDS     [facility] [phone]                 AFTER 24 HOURS YOU SHOULD REMOVE THE BANDAGE AND BEGIN DAILY DRESSING CHANGES AS FOLLOWS:     1) Remove Dressing.     2) Clean and dry the area with tap water using a Q-tip or sterile gauze pad.     3) Apply Vaseline, Aquaphor, Polysporin ointment or Bacitracin ointment over entire wound.  Do NOT use Neosporin ointment.     4) Cover the wound with a band-aid, or a sterile non-stick gauze pad and micropore paper tape      REPEAT THESE INSTRUCTIONS AT LEAST ONCE A DAY UNTIL THE WOUND HAS COMPLETELY HEALED.    It is an old wives tale that a wound heals better when it is exposed to air and allowed to dry out. The wound will heal faster with a better cosmetic result if it is kept moist with ointment and covered with a bandage.    **Do not let the wound dry out.**      Supplies Needed:      *Cotton tipped applicators (Q-tips)    *Polysporin Ointment or Bacitracin Ointment (NOT NEOSPORIN)    *Band-aids or non-stick gauze pads and micropore paper tape.      PATIENT INFORMATION:    During the healing process you will notice a number of changes. All wounds develop a small halo of redness surrounding the wound.  This means healing is occurring. Severe itching with extensive redness usually indicates sensitivity to the ointment or bandage tape used to dress the wound.  You should call our office if this develops.      Swelling  and/or discoloration around your surgical site is common, particularly when performed around the eye.    All wounds normally drain.  The larger the wound the more drainage there will be.  After 7-10 days, you will notice the wound beginning to shrink and new skin will begin to grow.  The wound is healed when you can see skin has formed over the entire area.  A healed wound has a healthy, shiny look to the surface and is red to dark pink in color to normalize.  Wounds may take approximately 4-6  weeks to heal.  Larger wounds may take 6-8 weeks.  After the wound is healed you may discontinue dressing changes.    You may experience a sensation of tightness as your wound heals. This is normal and will gradually subside.    Your healed wound may be sensitive to temperature changes. This sensitivity improves with time, but if you re having a lot of discomfort, try to avoid temperature extremes.    Patients frequently experience itching after their wound appears to have healed because of the continue healing under the skin.  Plain Vaseline will help relieve the itching.        POSSIBLE COMPLICATIONS    BLEEDIN. Leave the bandage in place.  2. Use tightly rolled up gauze or a cloth to apply direct pressure over the bandage for 30  minutes.  3. Reapply pressure for an additional 30 minutes if necessary  4. Use additional gauze and tape to maintain pressure once the bleeding has stopped.

## 2017-09-22 NOTE — MR AVS SNAPSHOT
After Visit Summary   9/22/2017    Arthur Jefferson    MRN: [id number]           Patient Information     Date Of Birth          1955        Visit Information        Provider Department      9/22/2017 8:30 AM Vi Woodard PA-C [facility]        Today's Diagnoses     Neoplasm of uncertain behavior of skin    -  1    Nevus        Lentigo        Angioma        Seborrheic keratosis          Care Instructions      Wound Care Instructions     FOR SUPERFICIAL WOUNDS     [facility] [phone]                 AFTER 24 HOURS YOU SHOULD REMOVE THE BANDAGE AND BEGIN DAILY DRESSING CHANGES AS FOLLOWS:     1) Remove Dressing.     2) Clean and dry the area with tap water using a Q-tip or sterile gauze pad.     3) Apply Vaseline, Aquaphor, Polysporin ointment or Bacitracin ointment over entire wound.  Do NOT use Neosporin ointment.     4) Cover the wound with a band-aid, or a sterile non-stick gauze pad and micropore paper tape      REPEAT THESE INSTRUCTIONS AT LEAST ONCE A DAY UNTIL THE WOUND HAS COMPLETELY HEALED.    It is an old wives tale that a wound heals better when it is exposed to air and allowed to dry out. The wound will heal faster with a better cosmetic result if it is kept moist with ointment and covered with a bandage.    **Do not let the wound dry out.**      Supplies Needed:      *Cotton tipped applicators (Q-tips)    *Polysporin Ointment or Bacitracin Ointment (NOT NEOSPORIN)    *Band-aids or non-stick gauze pads and micropore paper tape.      PATIENT INFORMATION:    During the healing process you will notice a number of changes. All wounds develop a small halo of redness surrounding the wound.  This means healing is occurring. Severe itching with extensive redness usually indicates sensitivity to the ointment or bandage tape used to dress the wound.  You should call our office if this develops.      Swelling  and/or discoloration around your  surgical site is common, particularly when performed around the eye.    All wounds normally drain.  The larger the wound the more drainage there will be.  After 7-10 days, you will notice the wound beginning to shrink and new skin will begin to grow.  The wound is healed when you can see skin has formed over the entire area.  A healed wound has a healthy, shiny look to the surface and is red to dark pink in color to normalize.  Wounds may take approximately 4-6 weeks to heal.  Larger wounds may take 6-8 weeks.  After the wound is healed you may discontinue dressing changes.    You may experience a sensation of tightness as your wound heals. This is normal and will gradually subside.    Your healed wound may be sensitive to temperature changes. This sensitivity improves with time, but if you re having a lot of discomfort, try to avoid temperature extremes.    Patients frequently experience itching after their wound appears to have healed because of the continue healing under the skin.  Plain Vaseline will help relieve the itching.        POSSIBLE COMPLICATIONS    BLEEDIN. Leave the bandage in place.  2. Use tightly rolled up gauze or a cloth to apply direct pressure over the bandage for 30  minutes.  3. Reapply pressure for an additional 30 minutes if necessary  4. Use additional gauze and tape to maintain pressure once the bleeding has stopped.            Follow-ups after your visit        Who to contact     If you have questions or need follow up information about today's clinic visit or your schedule please contact [facility] directly at [phone].  Normal or non-critical lab and imaging results will be communicated to you by MyChart, letter or phone within 4 business days after the clinic has received the results. If you do not hear from us within 7 days, please contact the clinic through MyChart or phone. If you have a critical or abnormal lab result, we will notify you by  "phone as soon as possible.  Submit refill requests through [email or web address] or call your pharmacy and they will forward the refill request to us. Please allow 3 business days for your refill to be completed.          Additional Information About Your Visit        [facility] Information     [email or web address] lets you send messages to your doctor, view your test results, renew your prescriptions, schedule appointments and more. To sign up, go to [email or web address] . Click on \"Log in\" on the left side of the screen, which will take you to the Welcome page. Then click on \"Sign up Now\" on the right side of the page.     You will be asked to enter the access code listed below, as well as some personal information. Please follow the directions to create your username and password.     Your access code is: [email or web address]  Expires: 2017  8:55 AM     Your access code will  in 90 days. If you need help or a new code, please call your [place] clinic or [phone].        Care EveryWhere ID     This is your Care EveryWhere ID. This could be used by other organizations to access your [place] medical records  [id number]        Your Vitals Were     Pulse Respirations Pulse Oximetry             61 18 99%          Blood Pressure from Last 3 Encounters:   17 115/81   17 129/78   17 115/62    Weight from Last 3 Encounters:   17 70.8 kg (156 lb)   17 71.2 kg (157 lb)   17 71.4 kg (157 lb 8 oz)              We Performed the Following     BIOPSY SKIN/SUBQ/MUC MEM, EACH ADDTL LESION     BIOPSY SKIN/SUBQ/MUC MEM, SINGLE LESION     Surgical pathology exam        Primary Care Provider Office Phone # Fax #    Naty Medeiros [phone] [address] [place] [address]        Equal Access to Services     JASON NUÑEZ : Kendal Dinh, imani lee, qakemal perez. Formerly [facility] [phone].    ATENCIÓN: Si habla " español, tiene a hidalgo disposición servicios gratuitos de asistencia lingüística. Tanisha mir [phone].    We comply with applicable federal civil rights laws and [place] laws. We do not discriminate on the basis of race, color, national origin, age, disability sex, sexual orientation or gender identity.            Thank you!     Thank you for choosing [facility]  for your care. Our goal is always to provide you with excellent care. Hearing back from our patients is one way we can continue to improve our services. Please take a few minutes to complete the written survey that you may receive in the mail after your visit with us. Thank you!             Your Updated Medication List - Protect others around you: Learn how to safely use, store and throw away your medicines at www.disposemymeds.org.          This list is accurate as of: 9/22/17  9:09 AM.  Always use your most recent med list.                   Brand Name Dispense Instructions for use Diagnosis    calcium-magnesium 500-250 MG Tabs per tablet    CALMAG     Take 1 tablet by mouth daily        multivitamin, therapeutic with minerals Tabs tablet      Take 1 tablet by mouth every other day        oxyCODONE-acetaminophen 5-325 MG per tablet    PERCOCET    25 tablet    Take 1-2 tablets by mouth every 4 hours as needed for moderate to severe pain    Prostate cancer (H)       VITAMIN C PO      Take 1 tablet by mouth daily        VITAMIN E PO      Take 1 tablet by mouth every other day

## 2017-09-22 NOTE — NURSING NOTE
"Chief Complaint   Patient presents with     Derm Problem     skin check       Initial /81  Pulse 61  Resp 18  SpO2 99% Estimated body mass index is 25.56 kg/(m^2) as calculated from the following:    Height as of 7/25/17: 1.664 m (5' 5.5\").    Weight as of 7/25/17: 70.8 kg (156 lb).  Blood pressure completed using cuff size: regular    "

## 2017-09-25 LAB — COPATH REPORT: NORMAL

## 2017-10-26 ENCOUNTER — OFFICE VISIT (OUTPATIENT)
Dept: DERMATOLOGY | Facility: CLINIC | Age: 62
End: 2017-10-26
Payer: COMMERCIAL

## 2017-10-26 VITALS — BODY MASS INDEX: 25.07 KG/M2 | WEIGHT: 156 LBS | OXYGEN SATURATION: 98 % | HEART RATE: 74 BPM | HEIGHT: 66 IN

## 2017-10-26 DIAGNOSIS — C44.41 BASAL CELL CARCINOMA, SCALP/NECK: ICD-10-CM

## 2017-10-26 DIAGNOSIS — C44.619 BASAL CELL CARCINOMA OF LEFT SHOULDER: Primary | ICD-10-CM

## 2017-10-26 DIAGNOSIS — C44.612 BASAL CELL CARCINOMA OF RIGHT SHOULDER: ICD-10-CM

## 2017-10-26 PROCEDURE — 13121 CMPLX RPR S/A/L 2.6-7.5 CM: CPT | Mod: 51 | Performed by: DERMATOLOGY

## 2017-10-26 PROCEDURE — 88331 PATH CONSLTJ SURG 1 BLK 1SPC: CPT | Mod: 59 | Performed by: DERMATOLOGY

## 2017-10-26 PROCEDURE — 17311 MOHS 1 STAGE H/N/HF/G: CPT | Performed by: DERMATOLOGY

## 2017-10-26 PROCEDURE — 11602 EXC TR-EXT MAL+MARG 1.1-2 CM: CPT | Mod: 59 | Performed by: DERMATOLOGY

## 2017-10-26 PROCEDURE — 17313 MOHS 1 STAGE T/A/L: CPT | Mod: 51 | Performed by: DERMATOLOGY

## 2017-10-26 NOTE — MR AVS SNAPSHOT
"              After Visit Summary   10/26/2017    Arthur Jefferson    MRN: [id number]           Patient Information     Date Of Birth          1955        Visit Information        Provider Department      10/26/2017 7:45 AM Dougie Zuñiga MD [facility]        Today's Diagnoses     Basal cell carcinoma of left shoulder    -  1    Basal cell carcinoma of right shoulder        Basal cell carcinoma, scalp/neck           Follow-ups after your visit        Who to contact     If you have questions or need follow up information about today's clinic visit or your schedule please contact [facility] directly at [phone].  Normal or non-critical lab and imaging results will be communicated to you by [employer], letter or phone within 4 business days after the clinic has received the results. If you do not hear from us within 7 days, please contact the clinic through [employer] or phone. If you have a critical or abnormal lab result, we will notify you by phone as soon as possible.  Submit refill requests through [employer] or call your pharmacy and they will forward the refill request to us. Please allow 3 business days for your refill to be completed.          Additional Information About Your Visit        MyChart Information     [employer] lets you send messages to your doctor, view your test results, renew your prescriptions, schedule appointments and more. To sign up, go to [email or web address] . Click on \"Log in\" on the left side of the screen, which will take you to the Welcome page. Then click on \"Sign up Now\" on the right side of the page.     You will be asked to enter the access code listed below, as well as some personal information. Please follow the directions to create your username and password.     Your access code is: [email or web address]  Expires: 2017  8:55 AM     Your access code will  in 90 days. If you need help or a new code, please call your " "[facility] or [phone].        Care EveryWhere ID     This is your Care EveryWhere ID. This could be used by other organizations to access your [place] medical records  [id number]        Your Vitals Were     Pulse Height Pulse Oximetry BMI (Body Mass Index)          74 1.664 m (5' 5.5\") 98% 25.56 kg/m2         Blood Pressure from Last 3 Encounters:   09/22/17 115/81   07/26/17 129/78   07/21/17 115/62    Weight from Last 3 Encounters:   10/26/17 70.8 kg (156 lb)   07/25/17 70.8 kg (156 lb)   07/21/17 71.2 kg (157 lb)              We Performed the Following     65046 - EXC MALIG SKIN LESION TRUNK/ARM/LEG 1.1-2.0 CM     CL FROZEN SECTION FIRST SPEC     MOHS HEAD/NCK/HND/FT/GEN 1ST STAGE UP T0 5 BLOCKS     MOHS TRUNK/ARM/LEG 1ST STAGE UP T0 5 BLOCKS     REPAIR COMPLEX, WOUND SCALP/ARM/LEG 2.6-7.5 CM        Primary Care Provider Office Phone # Fax #    Naty Medeiros [phone]       [address] [place] [address]        Equal Access to Services     JASON NUÑEZ : Hadii rashmi ku hadasho Soomaali, waaxda luqadaha, qaybta kaalmada adeegyada, kemal paz. So [facility] [phone].    ATENCIÓN: Si habla español, tiene a hidalgo disposición servicios gratuitos de asistencia lingüística. Llame al [phone].    We comply with applicable federal civil rights laws and [place] laws. We do not discriminate on the basis of race, color, national origin, age, disability, sex, sexual orientation, or gender identity.            Thank you!     Thank you for choosing [facility]  for your care. Our goal is always to provide you with excellent care. Hearing back from our patients is one way we can continue to improve our services. Please take a few minutes to complete the written survey that you may receive in the mail after your visit with us. Thank you!             Your Updated Medication List - Protect others around you: Learn how to safely use, " store and throw away your medicines at www.disposemymeds.org.          This list is accurate as of: 10/26/17 11:05 AM.  Always use your most recent med list.                   Brand Name Dispense Instructions for use Diagnosis    calcium-magnesium 500-250 MG Tabs per tablet    CALMAG     Take 1 tablet by mouth daily        multivitamin, therapeutic with minerals Tabs tablet      Take 1 tablet by mouth every other day        oxyCODONE-acetaminophen 5-325 MG per tablet    PERCOCET    25 tablet    Take 1-2 tablets by mouth every 4 hours as needed for moderate to severe pain    Prostate cancer (H)       VITAMIN C PO      Take 1 tablet by mouth daily        VITAMIN E PO      Take 1 tablet by mouth every other day

## 2017-10-26 NOTE — NURSING NOTE
Surgical Office Location:  [facility]  [address] [place] [id number]

## 2017-10-26 NOTE — NURSING NOTE
"Chief Complaint   Patient presents with     Derm Problem     MOHS       Initial Pulse 74  Ht 1.664 m (5' 5.5\")  Wt 70.8 kg (156 lb)  SpO2 98%  BMI 25.56 kg/m2 Estimated body mass index is 25.56 kg/(m^2) as calculated from the following:    Height as of this encounter: 1.664 m (5' 5.5\").    Weight as of this encounter: 70.8 kg (156 lb).  Medication Reconciliation: complete    "

## 2017-10-26 NOTE — LETTER
10/26/2017         RE: Arthur Jefferson  [address]  [facility] [address]        Dear Colleague,    Thank you for referring your patient, Arthur Jefferson, to the [facility]. Please see a copy of my visit note below.    Arthur Jefferson is a 62 year old year old male patient here today for evaluation and managment of basal cell carcinoma x3. Patient reports the following modifying factors none.  Associated symptoms: none.  Patient has no other skin complaints today.  Remainder of the HPI, Meds, PMH, Allergies, FH, and SH was reviewed in chart.      Past Medical History:   Diagnosis Date     BPH (benign prostatic hyperplasia)      Hyperlipidemia LDL goal <130      Prostate cancer (H) 3/3/2017    Dr. Hargrove[facility]     Renal disease     multiple kidney stones       Past Surgical History:   Procedure Laterality Date     DAVINCI PROSTATECTOMY N/A 7/25/2017    Procedure: DAVINCI XI PROSTATECTOMY;  ROBOTIC ASSISTED  RADICAL PROSTATECTOMY ;  Surgeon: Oswaldo Hargrove MD;  Location: [facility]     HERNIORRHAPHY INGUINAL      bilateral, one open, one laparoscopic     LAPAROSCOPIC HERNIORRHAPHY INGUINAL Right 1/18/2017    Procedure: LAPAROSCOPIC HERNIORRHAPHY INGUINAL;  Surgeon: Kwame Prado MD;  Location:  [facility]        Family History   Problem Relation Age of Onset     CANCER Mother      liver     Prostate Cancer Father      Asthma No family hx of      DIABETES No family hx of      Hypertension No family hx of      CEREBROVASCULAR DISEASE No family hx of      Breast Cancer No family hx of      Cancer - colorectal No family hx of      Alcohol/Drug No family hx of      Allergies No family hx of      Alzheimer Disease No family hx of      Arthritis No family hx of      Blood Disease No family hx of      Cardiovascular No family hx of      Depression No family hx of      Eye Disorder No family hx of      GASTROINTESTINAL DISEASE No family hx of      HEART DISEASE No family  hx of      Lipids No family hx of      Musculoskeletal Disorder No family hx of      Neurologic Disorder No family hx of      Obesity No family hx of      Psychotic Disorder No family hx of      Respiratory No family hx of      Thyroid Disease No family hx of        Social History     Social History     Marital status:      Spouse name: Jodi Jefferson     Number of children: 1     Years of education: N/A     Occupational History     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 No     Social History Narrative       Outpatient Encounter Prescriptions as of 10/26/2017   Medication Sig Dispense Refill     calcium-magnesium (CALMAG) 500-250 MG TABS per tablet Take 1 tablet by mouth daily       oxyCODONE-acetaminophen (PERCOCET) 5-325 MG per tablet Take 1-2 tablets by mouth every 4 hours as needed for moderate to severe pain 25 tablet 0     multivitamin, therapeutic with minerals (THERA-VIT-M) TABS tablet Take 1 tablet by mouth every other day       Ascorbic Acid (VITAMIN C PO) Take 1 tablet by mouth daily       VITAMIN E PO Take 1 tablet by mouth every other day       No facility-administered encounter medications on file as of 10/26/2017.              Review Of Systems  Skin: As above  Eyes: negative  Ears/Nose/Throat: negative  Respiratory: No shortness of breath, dyspnea on exertion, cough, or hemoptysis  Cardiovascular: negative  Gastrointestinal: negative  Genitourinary: negative  Musculoskeletal: negative  Neurologic: negative  Psychiatric:  "negative  Hematologic/Lymphatic/Immunologic: negative  Endocrine: negative      O:   NAD, WDWN, Alert & Oriented, Mood & Affect wnl, Vitals stable   Here today alone   Pulse 74  Ht 1.664 m (5' 5.5\")  Wt 70.8 kg (156 lb)  SpO2 98%  BMI 25.56 kg/m2   General appearance normal   Vitals stable   Alert, oriented and in no acute distress     R vertex scalp 1.4cm pink pearly papule   R post shoulder 1.5cm red plaque   L post shoudler 5mm scaly papule       Eyes: Conjunctivae/lids:Normal     ENT: Lips, buccal mucosa, tongue: normal    MSK:Normal    Cardiovascular: peripheral edema none    Pulm: Breathing Normal    Neuro/Psych: Orientation:Normal; Mood/Affect:Normal      MICRO:   L post shoulder:Unremarkable epidermis with parallel bundles of cellular collagen within the superficial dermis.  No concerning areas for malignancy.     A/P:  1. L post shoulder basal cell carcinoma   EXCISION OF BASAL CELL CARCINOMA, Margins confirmed with FROZEN SECTIONS AND Second intent: After thorough discussion of PGACAC, consent obtained, anesthesia and prep, the margins of the lesion were identified and an elliptical incision was made encompassing the lesion with 4mm margin. The incisions were made through the skin and down to and including the superficial dermis.  The lesion was removed en bloc and submitted for frozen section pathologic review. Clear margins obtained (1.2cm).    REPAIR SECOND INTENT: We discussed the options for wound management in full with the patient including risks/benefits/possible outcomes. Decision made to allow the wound to heal by second intention. EBL minimal; complications none; wound care routine.  The patient was discharged in good condition and will return in one month or prn for wound evaluation.     2. R vertex scalp  MOHS:   Location    After PGACAC discussed with patient, decision for Mohs surgery was made. Indication for Mohs was Location. Patient confirmed the site with Dr. Zuñiga.  After " anesthesia with LEC, the tumor was excised using standard Mohs technique in 1 stages(s).  CLEAR MARGINS OBTAINED and Final defect size was 2 cm.       REPAIR COMPLEX: Because of the tightness of the surrounding skin and Because of the size and full thickness nature of the defect, a complex closure was planned. After LEC anesthesia and prep, Burow's triangles were excised in the relaxed skin tension lines. The wound edges were widely undermined by dissection in the subcutaneous plane until adequate tissue mobility was obtained. Hemostasis was obtained. The wound edges were closed in a layered fashion using Vicryl and Fast Absorbing Plain Gut sutures. Postoperative length was 4.2 cm.   EBL minimal; complications none; wound care routine.  The patient was discharged in good condition and will return in one week for wound evaluation.    3. R post shoulder basal cell carcinoma  MOHS:   Size    After PGACAC discussed with patient, decision for Mohs surgery was made. Indication for Mohs was Size. Patient confirmed the site with Dr. Zuñiga.  After anesthesia with LEC, the tumor was excised using standard Mohs technique in 31 stages(s).  CLEAR MARGINS OBTAINED and Final defect size was 2.2 cm.       REPAIR SECOND INTENT: We discussed the options for wound management in full with the patient including risks/benefits/possible outcomes. Decision made to allow the wound to heal by second intention. EBL minimal; complications none; wound care routine.  The patient was discharged in good condition and will return in one month or prn for wound evaluation.      BENIGN LESIONS DISCUSSED WITH PATIENT:  I discussed the specifics of tumor, prognosis, and genetics of benign lesions.  I explained that treatment of these lesions would be purely cosmetic and not medically neccessary.  I discussed with patient different removal options including excision, cautery and /or laser.      Nature and genetics of benign skin lesions dicussed with  patient.  Signs and Symptoms of skin cancer discussed with patient.  Patient encouraged to perform monthly skin exams.  UV precautions reviewed with patient.  Skin care regimen reviewed with patient: Eliminate harsh soaps, i.e. Dial, zest, irsih spring; Mild soaps such as Cetaphil or Dove sensitive skin, avoid hot or cold showers, aggressive use of emollients including vanicream, cetaphil or cerave discussed with patient.    Risks of non-melanoma skin cancer discussed with patient   Return to clinic 6 months      Again, thank you for allowing me to participate in the care of your patient.        Sincerely,        Dougie Zuñiga MD

## 2017-10-26 NOTE — PROGRESS NOTES
Arthur Jefferson is a 62 year old year old male patient here today for evaluation and managment of basal cell carcinoma x3. Patient reports the following modifying factors none.  Associated symptoms: none.  Patient has no other skin complaints today.  Remainder of the HPI, Meds, PMH, Allergies, FH, and SH was reviewed in chart.      Past Medical History:   Diagnosis Date     BPH (benign prostatic hyperplasia)      Hyperlipidemia LDL goal <130      Prostate cancer (H) 3/3/2017    Dr. Hargrove, [facility]     Renal disease     multiple kidney stones       Past Surgical History:   Procedure Laterality Date     DAVINCI PROSTATECTOMY N/A 7/25/2017    Procedure: DAVINCI XI PROSTATECTOMY;  ROBOTIC ASSISTED  RADICAL PROSTATECTOMY ;  Surgeon: Oswaldo Hargrove MD;  Location: [facility]     HERNIORRHAPHY INGUINAL      bilateral, one open, one laparoscopic     LAPAROSCOPIC HERNIORRHAPHY INGUINAL Right 1/18/2017    Procedure: LAPAROSCOPIC HERNIORRHAPHY INGUINAL;  Surgeon: Kwame Prado MD;  Location:  [facility]        Family History   Problem Relation Age of Onset     CANCER Mother      liver     Prostate Cancer Father      Asthma No family hx of      DIABETES No family hx of      Hypertension No family hx of      CEREBROVASCULAR DISEASE No family hx of      Breast Cancer No family hx of      Cancer - colorectal No family hx of      Alcohol/Drug No family hx of      Allergies No family hx of      Alzheimer Disease No family hx of      Arthritis No family hx of      Blood Disease No family hx of      Cardiovascular No family hx of      Depression No family hx of      Eye Disorder No family hx of      GASTROINTESTINAL DISEASE No family hx of      HEART DISEASE No family hx of      Lipids No family hx of      Musculoskeletal Disorder No family hx of      Neurologic Disorder No family hx of      Obesity No family hx of      Psychotic Disorder No family hx of      Respiratory No family hx of      Thyroid Disease No family hx of   "      Social History     Social History     Marital status:      Spouse name: Jodi Jefferson     Number of children: 1     Years of education: N/A     Occupational History     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 No     Social History Narrative       Outpatient Encounter Prescriptions as of 10/26/2017   Medication Sig Dispense Refill     calcium-magnesium (CALMAG) 500-250 MG TABS per tablet Take 1 tablet by mouth daily       oxyCODONE-acetaminophen (PERCOCET) 5-325 MG per tablet Take 1-2 tablets by mouth every 4 hours as needed for moderate to severe pain 25 tablet 0     multivitamin, therapeutic with minerals (THERA-VIT-M) TABS tablet Take 1 tablet by mouth every other day       Ascorbic Acid (VITAMIN C PO) Take 1 tablet by mouth daily       VITAMIN E PO Take 1 tablet by mouth every other day       No facility-administered encounter medications on file as of 10/26/2017.              Review Of Systems  Skin: As above  Eyes: negative  Ears/Nose/Throat: negative  Respiratory: No shortness of breath, dyspnea on exertion, cough, or hemoptysis  Cardiovascular: negative  Gastrointestinal: negative  Genitourinary: negative  Musculoskeletal: negative  Neurologic: negative  Psychiatric: negative  Hematologic/Lymphatic/Immunologic: negative  Endocrine: negative      O:   NAD, WDWN, Alert & Oriented, Mood & Affect wnl, Vitals stable   Here today alone   Pulse 74  Ht 1.664 m (5' 5.5\")  Wt 70.8 kg (156 lb)  SpO2 98%  BMI 25.56 kg/m2   General appearance normal   Vitals stable   Alert, " oriented and in no acute distress     R vertex scalp 1.4cm pink pearly papule   R post shoulder 1.5cm red plaque   L post shoudler 5mm scaly papule       Eyes: Conjunctivae/lids:Normal     ENT: Lips, buccal mucosa, tongue: normal    MSK:Normal    Cardiovascular: peripheral edema none    Pulm: Breathing Normal    Neuro/Psych: Orientation:Normal; Mood/Affect:Normal      MICRO:   L post shoulder:Unremarkable epidermis with parallel bundles of cellular collagen within the superficial dermis.  No concerning areas for malignancy.     A/P:  1. L post shoulder basal cell carcinoma   EXCISION OF BASAL CELL CARCINOMA, Margins confirmed with FROZEN SECTIONS AND Second intent: After thorough discussion of PGACAC, consent obtained, anesthesia and prep, the margins of the lesion were identified and an elliptical incision was made encompassing the lesion with 4mm margin. The incisions were made through the skin and down to and including the superficial dermis.  The lesion was removed en bloc and submitted for frozen section pathologic review. Clear margins obtained (1.2cm).    REPAIR SECOND INTENT: We discussed the options for wound management in full with the patient including risks/benefits/possible outcomes. Decision made to allow the wound to heal by second intention. EBL minimal; complications none; wound care routine.  The patient was discharged in good condition and will return in one month or prn for wound evaluation.     2. R vertex scalp  MOHS:   Location    After PGACAC discussed with patient, decision for Mohs surgery was made. Indication for Mohs was Location. Patient confirmed the site with Dr. Zuñiga.  After anesthesia with LEC, the tumor was excised using standard Mohs technique in 1 stages(s).  CLEAR MARGINS OBTAINED and Final defect size was 2 cm.       REPAIR COMPLEX: Because of the tightness of the surrounding skin and Because of the size and full thickness nature of the defect, a complex closure was planned.  After LEC anesthesia and prep, Burow's triangles were excised in the relaxed skin tension lines. The wound edges were widely undermined by dissection in the subcutaneous plane until adequate tissue mobility was obtained. Hemostasis was obtained. The wound edges were closed in a layered fashion using Vicryl and Fast Absorbing Plain Gut sutures. Postoperative length was 4.2 cm.   EBL minimal; complications none; wound care routine.  The patient was discharged in good condition and will return in one week for wound evaluation.    3. R post shoulder basal cell carcinoma  MOHS:   Size    After PGACAC discussed with patient, decision for Mohs surgery was made. Indication for Mohs was Size. Patient confirmed the site with Dr. Zuñiga.  After anesthesia with LEC, the tumor was excised using standard Mohs technique in 1 stages(s).  CLEAR MARGINS OBTAINED and Final defect size was 2.2 cm.       REPAIR SECOND INTENT: We discussed the options for wound management in full with the patient including risks/benefits/possible outcomes. Decision made to allow the wound to heal by second intention. EBL minimal; complications none; wound care routine.  The patient was discharged in good condition and will return in one month or prn for wound evaluation.      BENIGN LESIONS DISCUSSED WITH PATIENT:  I discussed the specifics of tumor, prognosis, and genetics of benign lesions.  I explained that treatment of these lesions would be purely cosmetic and not medically neccessary.  I discussed with patient different removal options including excision, cautery and /or laser.      Nature and genetics of benign skin lesions dicussed with patient.  Signs and Symptoms of skin cancer discussed with patient.  Patient encouraged to perform monthly skin exams.  UV precautions reviewed with patient.  Skin care regimen reviewed with patient: Eliminate harsh soaps, i.e. Dial, zest, irsih spring; Mild soaps such as Cetaphil or Dove sensitive skin, avoid  hot or cold showers, aggressive use of emollients including vanicream, cetaphil or cerave discussed with patient.    Risks of non-melanoma skin cancer discussed with patient   Return to clinic 6 months

## 2017-11-09 ENCOUNTER — TRANSFERRED RECORDS (OUTPATIENT)
Dept: HEALTH INFORMATION MANAGEMENT | Facility: CLINIC | Age: 62
End: 2017-11-09

## 2017-11-09 ENCOUNTER — ALLIED HEALTH/NURSE VISIT (OUTPATIENT)
Dept: DERMATOLOGY | Facility: CLINIC | Age: 62
End: 2017-11-09
Payer: COMMERCIAL

## 2017-11-09 DIAGNOSIS — Z48.01 ENCOUNTER FOR CHANGE OR REMOVAL OF SURGICAL WOUND DRESSING: Primary | ICD-10-CM

## 2017-11-09 PROCEDURE — 99207 ZZC NO CHARGE NURSE ONLY: CPT

## 2017-11-09 NOTE — NURSING NOTE
Pt returned to clinic for post surgery 1 week follow up bandage change. Pt has no complaints, denies pain. Bandage removed head, area cleansed with normal saline. Site is healing and wound edges approximating well. Removed staples and applied Aquaphor. Patient refused bandage.     Advised to watch for signs/sx of infection; spreading redness, drainage, odor, fever. Call or report promptly to clinic. Pt given written instructions and informed to rtc as needed. Patient verbalized understanding.

## 2017-11-09 NOTE — MR AVS SNAPSHOT
After Visit Summary   11/9/2017    Arthur Jefferson    MRN: [id number]           Patient Information     Date Of Birth          1955        Visit Information        Provider Department      11/9/2017 1:00 PM [facility] NURSE [facility]        Care Instructions    ONE WEEK DRESSING CHANGE  For  STAPLE REMOVAL     The following information has been compiled to offer assistance with the dressing change or wound evaluation. Please feel free to call our office to speak with one of the nurses if you have any questions or concerns about the progress of the wound healing process especially if there are any signs of flap necrosis or infection. We will be happy to answer any questions you might have.                                                 AFTER 24 HOURS YOU SHOULD REMOVE THE BANDAGE AND BEGIN DAILY DRESSING CHANGES AS FOLLOWS:     1) Remove Dressing.     2) Clean and dry the area with tap water using a Q-tip or sterile gauze pad.     3) Apply Vaseline, Polysporin ointment, Aquaphor or Bacitracin ointment over entire wound.  Do NOT use Neosporin ointment.     4) Cover the wound with a band-aid, or a sterile non-stick gauze pad and micropore paper tape      REPEAT THESE INSTRUCTIONS AT LEAST ONCE A DAY UNTIL THE WOUND HAS COMPLETELY HEALED. DO NOT LET THE WOUND SCAB OVER.    It is an old wives tale that a wound heals better when it is exposed to air and allowed to dry out. The wound will heal faster with a better cosmetic result if it is kept moist with ointment and covered with a bandage.     Massaging the wound site hastens the healing process by softening the scar tissue and fading the scar. Begin massaging the area one month after surgery as often as possible. Continue to massage the area for 2-3 months or until they feel the scar tissue has softened. Moisturizers can be used during the massaging but are not necessary. Ultimately it takes six months for the scar to soften  "and blend into the surrounding skin.           Follow-ups after your visit        Who to contact     If you have questions or need follow up information about today's clinic visit or your schedule please contact [facility] directly at [phone].  Normal or non-critical lab and imaging results will be communicated to you by MyChart, letter or phone within 4 business days after the clinic has received the results. If you do not hear from us within 7 days, please contact the clinic through MyChart or phone. If you have a critical or abnormal lab result, we will notify you by phone as soon as possible.  Submit refill requests through Keepio or call your pharmacy and they will forward the refill request to us. Please allow 3 business days for your refill to be completed.          Additional Information About Your Visit        MyCharMakieLab Information     Keepio lets you send messages to your doctor, view your test results, renew your prescriptions, schedule appointments and more. To sign up, go to [email or web address] . Click on \"Log in\" on the left side of the screen, which will take you to the Welcome page. Then click on \"Sign up Now\" on the right side of the page.     You will be asked to enter the access code listed below, as well as some personal information. Please follow the directions to create your username and password.     Your access code is: [email or web address]  Expires: 2017  7:55 AM     Your access code will  in 90 days. If you need help or a new code, please call your [facility] clinic or [phone].        Care EveryWhere ID     This is your Care EveryWhere ID. This could be used by other organizations to access your [facility] medical records  [id number]         Blood Pressure from Last 3 Encounters:   17 115/81   17 129/78   17 115/62    Weight from Last 3 Encounters:   10/26/17 70.8 kg (156 lb)   17 70.8 kg (156 lb)   17 71.2 kg (157 lb) "              Today, you had the following     No orders found for display       Primary Care Provider Office Phone # Fax #    Naty Medeiros [phone] [address]  [facility] [address]        Equal Access to Services     JASON NUÑEZ : Hadii rashmi bourgeois lindao Sojean claudeali, waaxda luqadaha, qaybta kaalmada adeaugustineyada, kemal garciain hayaavannesa kernsaugustine livingston fanta paz. So [facility] [phone].    ATENCIÓN: Si habla español, tiene a hidalgo disposición servicios gratuitos de asistencia lingüística. Llame al [phone].    We comply with applicable federal civil rights laws and [place] laws. We do not discriminate on the basis of race, color, national origin, age, disability, sex, sexual orientation, or gender identity.            Thank you!     Thank you for choosing [facility]  for your care. Our goal is always to provide you with excellent care. Hearing back from our patients is one way we can continue to improve our services. Please take a few minutes to complete the written survey that you may receive in the mail after your visit with us. Thank you!             Your Updated Medication List - Protect others around you: Learn how to safely use, store and throw away your medicines at www.disposemymeds.org.          This list is accurate as of: 11/9/17  1:09 PM.  Always use your most recent med list.                   Brand Name Dispense Instructions for use Diagnosis    calcium-magnesium 500-250 MG Tabs per tablet    CALMAG     Take 1 tablet by mouth daily        multivitamin, therapeutic with minerals Tabs tablet      Take 1 tablet by mouth every other day        oxyCODONE-acetaminophen 5-325 MG per tablet    PERCOCET    25 tablet    Take 1-2 tablets by mouth every 4 hours as needed for moderate to severe pain    Prostate cancer (H)       VITAMIN C PO      Take 1 tablet by mouth daily        VITAMIN E PO      Take 1 tablet by mouth every other day

## 2017-12-14 ENCOUNTER — TRANSFERRED RECORDS (OUTPATIENT)
Dept: HEALTH INFORMATION MANAGEMENT | Facility: CLINIC | Age: 62
End: 2017-12-14

## 2018-03-14 ENCOUNTER — TRANSFERRED RECORDS (OUTPATIENT)
Dept: HEALTH INFORMATION MANAGEMENT | Facility: CLINIC | Age: 63
End: 2018-03-14

## 2018-03-21 ENCOUNTER — TRANSFERRED RECORDS (OUTPATIENT)
Dept: HEALTH INFORMATION MANAGEMENT | Facility: CLINIC | Age: 63
End: 2018-03-21

## 2018-04-09 ENCOUNTER — OFFICE VISIT (OUTPATIENT)
Dept: INTERNAL MEDICINE | Facility: CLINIC | Age: 63
End: 2018-04-09
Payer: COMMERCIAL

## 2018-04-09 VITALS
DIASTOLIC BLOOD PRESSURE: 72 MMHG | HEART RATE: 58 BPM | HEIGHT: 65 IN | RESPIRATION RATE: 156 BRPM | WEIGHT: 162.1 LBS | OXYGEN SATURATION: 98 % | BODY MASS INDEX: 27.01 KG/M2 | SYSTOLIC BLOOD PRESSURE: 100 MMHG | TEMPERATURE: 98 F

## 2018-04-09 DIAGNOSIS — L98.9 SKIN LESION: Primary | ICD-10-CM

## 2018-04-09 DIAGNOSIS — L30.9 DERMATITIS: ICD-10-CM

## 2018-04-09 PROCEDURE — 99213 OFFICE O/P EST LOW 20 MIN: CPT | Performed by: PHYSICIAN ASSISTANT

## 2018-04-09 RX ORDER — TRIAMCINOLONE ACETONIDE 1 MG/G
OINTMENT TOPICAL
Qty: 30 G | Refills: 0 | Status: SHIPPED | OUTPATIENT
Start: 2018-04-09 | End: 2018-11-19

## 2018-04-09 ASSESSMENT — PAIN SCALES - GENERAL: PAINLEVEL: NO PAIN (0)

## 2018-04-09 NOTE — PROGRESS NOTES
"  SUBJECTIVE:   Arthur Jefferson is a 63 year old male who presents to clinic today for the following health issues:    Chief Complaint   Patient presents with     Derm Problem     Possible Ring worm- Pt has a spot on the back of his L calf and states that it looks like Ring worm, but it has a blood spot in the middle-  Pt did try lotrim for 2 1/2 weeks and did nothing       Rash      Duration: 5 weeks     Description  Location: right lower leg   Itching: severe    Intensity:  severe    Accompanying signs and symptoms: none     History (similar episodes/previous evaluation): None    Precipitating or alleviating factors:  New exposures:  None  Recent travel: YES- [place]     Therapies tried and outcome:  Lotrimin x 2 weeks- no relief   - denies fever or cold symptoms       Problem list and histories reviewed & adjusted, as indicated.  Additional history: as documented    Reviewed and updated as needed this visit by clinical staff  Tobacco  Allergies  Meds  Problems       Reviewed and updated as needed this visit by Provider  Meds  Problems           OBJECTIVE:     /72  Pulse 58  Temp 98  F (36.7  C) (Oral)  Resp (!) 156  Ht 5' 5\" (1.651 m)  Wt 162 lb 1.6 oz (73.5 kg)  SpO2 98%  BMI 26.97 kg/m2  Body mass index is 26.97 kg/(m^2).  GENERAL: healthy, alert and no distress  SKIN: annular salmon colored patch on RLE with dryness, scaling and minimal clear weeping, edges are somewhat blurred vs demarcated, no central clearing, rash feels very rough in texture     Diagnostic Test Results:  none     ASSESSMENT/PLAN:       1. Skin lesion  - unknown etiology at this time   - differential of ring worm vs dermatitis vs infectious cause   - with lotrimin not working and appearence somewhat atypical will treat for suspected dermatitis with steroid as below and referral to dermatology   - pt is advised to follow up if symptoms are worsening prior to dermatology availability     2. Dermatitis  - triamcinolone " (KENALOG) 0.1 % ointment; Apply sparingly to affected area three times daily for 14 days.  Dispense: 30 g; Refill: 0  - DERMATOLOGY REFERRAL    Pt agrees to above plan and all questions are answered.     Keira Boyd PA-C  [facility]

## 2018-04-09 NOTE — MR AVS SNAPSHOT
After Visit Summary   4/9/2018    Arthur Jefferson    MRN: [id number]           Patient Information     Date Of Birth          1955        Visit Information        Provider Department      4/9/2018 7:20 AM Keira Boyd PA-C [facility]        Today's Diagnoses     Dermatitis    -  1      Care Instructions    Apply twice a day for 2 weeks, take one week break after this and repeat as needed     Follow up if symptoms worsen or fail to improve           Follow-ups after your visit        Additional Services     DERMATOLOGY REFERRAL       Your provider has referred you to: [facility]: [facility] [phone]   [email or web address]  [facility]: [facility] [phone]    Please be aware that coverage of these services is subject to the terms and limitations of your health insurance plan.  Call member services at your health plan with any benefit or coverage questions.      Please bring the following with you to your appointment:    (1) Any X-Rays, CTs or MRIs which have been performed.  Contact the facility where they were done to arrange for  prior to your scheduled appointment.    (2) List of current medications  (3) This referral request   (4) Any documents/labs given to you for this referral                  Who to contact     If you have questions or need follow up information about today's clinic visit or your schedule please contact [facility] directly at [phone].  Normal or non-critical lab and imaging results will be communicated to you by MyChart, letter or phone within 4 business days after the clinic has received the results. If you do not hear from us within 7 days, please contact the clinic through MyChart or phone. If you have a critical or abnormal lab result, we will notify you by phone as soon as possible.  Submit refill requests through  "Christinet or call your pharmacy and they will forward the refill request to us. Please allow 3 business days for your refill to be completed.          Additional Information About Your Visit        MyChart Information     Pinteresthart lets you send messages to your doctor, view your test results, renew your prescriptions, schedule appointments and more. To sign up, go to [email or web address] . Click on \"Log in\" on the left side of the screen, which will take you to the Welcome page. Then click on \"Sign up Now\" on the right side of the page.     You will be asked to enter the access code listed below, as well as some personal information. Please follow the directions to create your username and password.     Your access code is: [email or web address]  Expires: 2018  8:01 AM     Your access code will  in 90 days. If you need help or a new code, please call your [facility] clinic or [phone].        Care EveryWhere ID     This is your Care EveryWhere ID. This could be used by other organizations to access your [facility] medical records  [id number]        Your Vitals Were     Pulse Temperature Respirations Height Pulse Oximetry BMI (Body Mass Index)    58 98  F (36.7  C) (Oral) 156 5' 5\" (1.651 m) 98% 26.97 kg/m2       Blood Pressure from Last 3 Encounters:   18 100/72   17 115/81   17 129/78    Weight from Last 3 Encounters:   18 162 lb 1.6 oz (73.5 kg)   10/26/17 156 lb (70.8 kg)   17 156 lb (70.8 kg)              We Performed the Following     DERMATOLOGY REFERRAL          Today's Medication Changes          These changes are accurate as of 18  8:01 AM.  If you have any questions, ask your nurse or doctor.               Start taking these medicines.        Dose/Directions    triamcinolone 0.1 % ointment   Commonly known as:  KENALOG   Used for:  Dermatitis   Started by:  Keira Boyd PA-C        Apply sparingly to affected area three times daily for 14 days.   Quantity:  30 g "   Refills:  0            Where to get your medicines      These medications were sent to [id number] - [facility] [address] [facility] [address] [facility] [address]  [address] [place], [place] [address] [address]     Phone:  [phone]     triamcinolone 0.1 % ointment                Primary Care Provider Office Phone # Fax #    Naty Medeiros [phone] [address] [facility] [address]        Equal Access to Services     [facility]enc[facility] HospitalLEONARDA : Hadii aad ku hadasho Soomaali, waaxda luqadaha, qaybta kaalmada adeegyada, waxhubert sears hayana jewell . So [facility] [phone].    ATENCIÓN: Si habla español, tiene a hidalgo disposición servicios gratuitos de asistencia lingüística. Tanisha al [phone].    We comply with applicable federal civil rights laws and [place] laws. We do not discriminate on the basis of race, color, national origin, age, disability, sex, sexual orientation, or gender identity.            Thank you!     Thank you for choosing [facility]  for your care. Our goal is always to provide you with excellent care. Hearing back from our patients is one way we can continue to improve our services. Please take a few minutes to complete the written survey that you may receive in the mail after your visit with us. Thank you!             Your Updated Medication List - Protect others around you: Learn how to safely use, store and throw away your medicines at www.disposemymeds.org.          This list is accurate as of 4/9/18  8:01 AM.  Always use your most recent med list.                   Brand Name Dispense Instructions for use Diagnosis    calcium-magnesium 500-250 MG Tabs per tablet    CALMAG     Take 1 tablet by mouth daily        multivitamin, therapeutic with minerals Tabs tablet      Take 1 tablet by mouth every other day        oxyCODONE-acetaminophen 5-325 MG per tablet    PERCOCET    25 tablet    Take 1-2 tablets by mouth  every 4 hours as needed for moderate to severe pain    Prostate cancer (H)       triamcinolone 0.1 % ointment    KENALOG    30 g    Apply sparingly to affected area three times daily for 14 days.    Dermatitis       VITAMIN C PO      Take 1 tablet by mouth daily        VITAMIN E PO      Take 1 tablet by mouth every other day

## 2018-04-09 NOTE — PATIENT INSTRUCTIONS
Apply twice a day for 2 weeks, take one week break after this and repeat as needed     Follow up if symptoms worsen or fail to improve

## 2018-04-19 ENCOUNTER — TRANSFERRED RECORDS (OUTPATIENT)
Dept: HEALTH INFORMATION MANAGEMENT | Facility: CLINIC | Age: 63
End: 2018-04-19

## 2018-06-13 ENCOUNTER — TRANSFERRED RECORDS (OUTPATIENT)
Dept: HEALTH INFORMATION MANAGEMENT | Facility: CLINIC | Age: 63
End: 2018-06-13

## 2018-11-19 ENCOUNTER — OFFICE VISIT (OUTPATIENT)
Dept: FAMILY MEDICINE | Facility: CLINIC | Age: 63
End: 2018-11-19
Payer: COMMERCIAL

## 2018-11-19 VITALS
RESPIRATION RATE: 12 BRPM | HEART RATE: 61 BPM | BODY MASS INDEX: 26.25 KG/M2 | TEMPERATURE: 97.5 F | DIASTOLIC BLOOD PRESSURE: 64 MMHG | WEIGHT: 157.75 LBS | SYSTOLIC BLOOD PRESSURE: 122 MMHG | OXYGEN SATURATION: 98 %

## 2018-11-19 DIAGNOSIS — Z00.00 ROUTINE GENERAL MEDICAL EXAMINATION AT A HEALTH CARE FACILITY: Primary | ICD-10-CM

## 2018-11-19 DIAGNOSIS — Z13.220 LIPID SCREENING: ICD-10-CM

## 2018-11-19 DIAGNOSIS — R73.01 ELEVATED FASTING GLUCOSE: ICD-10-CM

## 2018-11-19 DIAGNOSIS — C61 PROSTATE CANCER (H): ICD-10-CM

## 2018-11-19 DIAGNOSIS — Z23 NEED FOR TDAP VACCINATION: ICD-10-CM

## 2018-11-19 LAB
ANION GAP SERPL CALCULATED.3IONS-SCNC: 6 MMOL/L (ref 3–14)
BUN SERPL-MCNC: 13 MG/DL (ref 7–30)
CALCIUM SERPL-MCNC: 9.1 MG/DL (ref 8.5–10.1)
CHLORIDE SERPL-SCNC: 107 MMOL/L (ref 94–109)
CHOLEST SERPL-MCNC: 221 MG/DL
CO2 SERPL-SCNC: 24 MMOL/L (ref 20–32)
CREAT SERPL-MCNC: 0.77 MG/DL (ref 0.66–1.25)
GFR SERPL CREATININE-BSD FRML MDRD: >90 ML/MIN/1.7M2
GLUCOSE SERPL-MCNC: 99 MG/DL (ref 70–99)
HDLC SERPL-MCNC: 41 MG/DL
LDLC SERPL CALC-MCNC: 155 MG/DL
NONHDLC SERPL-MCNC: 180 MG/DL
POTASSIUM SERPL-SCNC: 4.7 MMOL/L (ref 3.4–5.3)
SODIUM SERPL-SCNC: 137 MMOL/L (ref 133–144)
TRIGL SERPL-MCNC: 124 MG/DL

## 2018-11-19 PROCEDURE — 80061 LIPID PANEL: CPT | Performed by: FAMILY MEDICINE

## 2018-11-19 PROCEDURE — 90471 IMMUNIZATION ADMIN: CPT | Performed by: FAMILY MEDICINE

## 2018-11-19 PROCEDURE — 99396 PREV VISIT EST AGE 40-64: CPT | Mod: 25 | Performed by: FAMILY MEDICINE

## 2018-11-19 PROCEDURE — 36415 COLL VENOUS BLD VENIPUNCTURE: CPT | Performed by: FAMILY MEDICINE

## 2018-11-19 PROCEDURE — 90715 TDAP VACCINE 7 YRS/> IM: CPT | Performed by: FAMILY MEDICINE

## 2018-11-19 PROCEDURE — 80048 BASIC METABOLIC PNL TOTAL CA: CPT | Performed by: FAMILY MEDICINE

## 2018-11-19 ASSESSMENT — ENCOUNTER SYMPTOMS
CONSTIPATION: 0
CHILLS: 0
HEMATOCHEZIA: 0
DIZZINESS: 0
COUGH: 0
DIARRHEA: 0
EYE PAIN: 0
HEMATURIA: 0
ABDOMINAL PAIN: 0

## 2018-11-19 NOTE — PROGRESS NOTES
SUBJECTIVE:   CC: Arthur Jefferson is an 63 year old male who presents for preventative health visit.     Answers for HPI/ROS submitted by the patient on 11/19/2018   Annual Exam:  Frequency of exercise:: 6-7 days/week  Getting at least 3 servings of Calcium per day:: Yes  Diet:: Regular (no restrictions)  Taking medications regularly:: Yes  Medication side effects:: Not applicable  Bi-annual eye exam:: Yes  Dental care twice a year:: Yes  Sleep apnea or symptoms of sleep apnea:: None  Additional concerns today:: No  PHQ-2 Score: 0  Duration of exercise:: 30-45 minutes      Today's PHQ-2 Score:   PHQ-2 ( 1999 [employer]) 11/19/2018 4/9/2018   Q1: Little interest or pleasure in doing things 0 0   Q2: Feeling down, depressed or hopeless 0 0   PHQ-2 Score 0 0   Q1: Little interest or pleasure in doing things Not at all -   Q2: Feeling down, depressed or hopeless Not at all -   PHQ-2 Score 0 -       Abuse: Current or Past(Physical, Sexual or Emotional)- No  Do you feel safe in your environment - Yes    Social History   Substance Use Topics     Smoking status: Former Smoker     Packs/day: 0.40     Years: 29.00     Quit date: 11/30/2008     Smokeless tobacco: Former User     Alcohol use 1.2 oz/week     2 Standard drinks or equivalent per week      Comment: 4 weekly      If you drink alcohol do you typically have >3 drinks per day or >7 drinks per week? No                      Last PSA:   PSA   Date Value Ref Range Status   12/30/2008 2.78 0 - 4 ug/L Final       Reviewed orders with patient. Reviewed health maintenance and updated orders accordingly - Yes  BP Readings from Last 3 Encounters:   11/19/18 122/64   04/09/18 100/72   09/22/17 115/81    Wt Readings from Last 3 Encounters:   11/19/18 157 lb 12 oz (71.6 kg)   04/09/18 162 lb 1.6 oz (73.5 kg)   10/26/17 156 lb (70.8 kg)          Reviewed and updated as needed this visit by clinical staff  Tobacco  Allergies  Meds  Problems  Med Hx  Surg Hx  Fam Hx  Soc Hx           Reviewed and updated as needed this visit by Provider  Tobacco  Meds  Problems  Med Hx  Surg Hx  Fam Hx  Soc Hx       Past Medical History:   Diagnosis Date     BPH (benign prostatic hyperplasia)      Hyperlipidemia LDL goal <130      Prostate cancer (H) 3/3/2017    Dr. Hargrove, [facility]     Renal disease     multiple kidney stones      Past Surgical History:   Procedure Laterality Date     DAVINCI PROSTATECTOMY N/A 7/25/2017    Procedure: DAVINCI XI PROSTATECTOMY;  ROBOTIC ASSISTED  RADICAL PROSTATECTOMY ;  Surgeon: Oswaldo Hargrove MD;  Location:  OR     HERNIORRHAPHY INGUINAL      bilateral, one open, one laparoscopic     LAPAROSCOPIC HERNIORRHAPHY INGUINAL Right 1/18/2017    Procedure: LAPAROSCOPIC HERNIORRHAPHY INGUINAL;  Surgeon: Kwame rPado MD;  Location: [facility]       ROS:  Negative for the following: abdominal pain, Blood in stool, Blood in urine, chest pain, chills, congestion, constipation, cough, diarrhea, dizziness, ear pain, eye pain, nervous/anxious, fever, frequency, genital sores, headaches, hearing loss, heartburn, arthralgias, joint swelling, peripheral edema, mood changes, myalgias, nausea, dysuria, palpitations, Skin sensation changes, sore throat, urgency, rash, shortness of breath, visual disturbance, weakness    OBJECTIVE:   /64 (BP Location: Right arm, Patient Position: Chair, Cuff Size: Adult Regular)  Pulse 61  Temp 97.5  F (36.4  C) (Oral)  Resp 12  Wt 157 lb 12 oz (71.6 kg)  SpO2 98%  BMI 26.25 kg/m2  EXAM:  GENERAL: healthy, alert and no distress  EYES: Eyes grossly normal to inspection, PERRL and conjunctivae and sclerae normal  HENT: ear canals and TM's normal, nose and mouth without ulcers or lesions  NECK: no adenopathy, no asymmetry, masses, or scars and thyroid normal to palpation  RESP: lungs clear to auscultation - no rales, rhonchi or wheezes  CV: regular rate and rhythm, normal S1 S2, no S3 or S4, no murmur, click or rub, no  "peripheral edema and peripheral pulses strong  ABDOMEN: soft, nontender, no hepatosplenomegaly, no masses and bowel sounds normal  MS: no gross musculoskeletal defects noted, no edema  SKIN: no suspicious lesions or rashes  NEURO: Normal strength and tone, mentation intact and speech normal  PSYCH: mentation appears normal, affect normal/bright    Diagnostic Test Results:  none     ASSESSMENT/PLAN:   1. Routine general medical examination at a health care facility  He declines flu shot and shingrix today.  We discussed risks of not vaccinating.  His philosophy is that vaccines generally are \"full of toxins.\"  I recommended colonoscopy as he reports his last colonoscopy (in [place]) was done in 2011 and polyps were found; he declined but will consider next year.    2. Lipid screening  - Lipid panel reflex to direct LDL Fasting    3. Elevated fasting glucose  - Basic metabolic panel    4. Prostate cancer (H)  Recommended follow-up with Dr. Hargrove    5. Need for Tdap vaccination  - TDAP, IM (10 - 64 YRS) - Adacel  - ADMIN 1st VACCINE      COUNSELING:  Reviewed preventive health counseling, as reflected in patient instructions      Estimated body mass index is 26.25 kg/(m^2) as calculated from the following:    Height as of 4/9/18: 5' 5\" (1.651 m).    Weight as of this encounter: 157 lb 12 oz (71.6 kg).    BP Screening:   Last 3 BP Readings:    BP Readings from Last 3 Encounters:   11/19/18 122/64   04/09/18 100/72   09/22/17 115/81       The following was recommended to the patient:  Re-screen BP within a year and recommended lifestyle modifications      Counseling Resources:  ATP IV Guidelines  Pooled Cohorts Equation Calculator  FRAX Risk Assessment  ICSI Preventive Guidelines  Dietary Guidelines for Americans, 2010  USDA's MyPlate  ASA Prophylaxis  Lung CA Screening    Naty Medeiros MD  [facility]  "

## 2018-11-19 NOTE — MR AVS SNAPSHOT
After Visit Summary   11/19/2018    Arthur Jefferson    MRN: [id number]           Patient Information     Date Of Birth          1955        Visit Information        Provider Department      11/19/2018 9:20 AM Naty Medeiros MD [facility]        Today's Diagnoses     Routine general medical examination at a health care facility    -  1    Lipid screening        Elevated fasting glucose        Prostate cancer (H)        Need for Tdap vaccination          Care Instructions      Preventive Health Recommendations  Male Ages 50 - 64    Yearly exam:             See your health care provider every year in order to  o   Review health changes.   o   Discuss preventive care.    o   Review your medicines if your doctor has prescribed any.     Have a cholesterol test every 5 years, or more frequently if you are at risk for high cholesterol/heart disease.     Have a diabetes test (fasting glucose) every three years. If you are at risk for diabetes, you should have this test more often.     Have a colonoscopy at age 50, or have a yearly FIT test (stool test). These exams will check for colon cancer.      Talk with your health care provider about whether or not a prostate cancer screening test (PSA) is right for you.    You should be tested each year for STDs (sexually transmitted diseases), if you re at risk.     Shots: Get a flu shot each year. Get a tetanus shot every 10 years.     Nutrition:    Eat at least 5 servings of fruits and vegetables daily.     Eat whole-grain bread, whole-wheat pasta and brown rice instead of white grains and rice.     Get adequate Calcium and Vitamin D.     Lifestyle    Exercise for at least 150 minutes a week (30 minutes a day, 5 days a week). This will help you control your weight and prevent disease.     Limit alcohol to one drink per day.     No smoking.     Wear sunscreen to prevent skin cancer.     See your dentist every six months for an exam and  cleaning.     See your eye doctor every 1 to 2 years.            Follow-ups after your visit        Who to contact     If you have questions or need follow up information about today's clinic visit or your schedule please contact [facility] directly at [phone].  Normal or non-critical lab and imaging results will be communicated to you by MyChart, letter or phone within 4 business days after the clinic has received the results. If you do not hear from us within 7 days, please contact the clinic through MyChart or phone. If you have a critical or abnormal lab result, we will notify you by phone as soon as possible.  Submit refill requests through [employer] or call your pharmacy and they will forward the refill request to us. Please allow 3 business days for your refill to be completed.          Additional Information About Your Visit        Care EveryWhere ID     This is your Care EveryWhere ID. This could be used by other organizations to access your [place] medical records  [id number]        Your Vitals Were     Pulse Temperature Respirations Pulse Oximetry BMI (Body Mass Index)       61 97.5  F (36.4  C) (Oral) 12 98% 26.25 kg/m2        Blood Pressure from Last 3 Encounters:   11/19/18 122/64   04/09/18 100/72   09/22/17 115/81    Weight from Last 3 Encounters:   11/19/18 157 lb 12 oz (71.6 kg)   04/09/18 162 lb 1.6 oz (73.5 kg)   10/26/17 156 lb (70.8 kg)              We Performed the Following     Basic metabolic panel     Lipid panel reflex to direct LDL Fasting     TDAP, IM (10 - 64 YRS) - Adacel          Today's Medication Changes          These changes are accurate as of 11/19/18  9:57 AM.  If you have any questions, ask your nurse or doctor.               Stop taking these medicines if you haven't already. Please contact your care team if you have questions.     oxyCODONE-acetaminophen 5-325 MG per tablet   Commonly known as:  PERCOCET   Stopped by:  Naty Medeiros MD            triamcinolone 0.1 % ointment   Commonly known as:  KENALOG   Stopped by:  Naty Medeiros MD                    Primary Care Provider Office Phone # Fax #    Naty Medeiros [phone] [address]  [facility] [address]        Equal Access to Services     [facility]: Hadii aad ku hadasho Soomaali, waaxda luqadaha, qaybta kaalmada adeegyada, waxay idiin hayaan adeaugustine livingston laingridn . So [facility] [phone].    ATENCIÓN: Si habla español, tiene a hidalgo disposición servicios gratuitos de asistencia lingüística. Tanisha al [phone].    We comply with applicable federal civil rights laws and [place] laws. We do not discriminate on the basis of race, color, national origin, age, disability, sex, sexual orientation, or gender identity.            Thank you!     Thank you for choosing [facility]  for your care. Our goal is always to provide you with excellent care. Hearing back from our patients is one way we can continue to improve our services. Please take a few minutes to complete the written survey that you may receive in the mail after your visit with us. Thank you!             Your Updated Medication List - Protect others around you: Learn how to safely use, store and throw away your medicines at www.disposemymeds.org.          This list is accurate as of 11/19/18  9:57 AM.  Always use your most recent med list.                   Brand Name Dispense Instructions for use Diagnosis    calcium-magnesium 500-250 MG Tabs per tablet    CALMAG     Take 1 tablet by mouth daily        multivitamin, therapeutic with minerals Tabs tablet      Take 1 tablet by mouth every other day        VITAMIN C PO      Take 1 tablet by mouth daily        VITAMIN E PO      Take 1 tablet by mouth every other day

## 2018-11-19 NOTE — PROGRESS NOTES
Screening Questionnaire for Adult Immunization     Are you sick today?   No    Do you have allergies to medications, food or any vaccine?   No    Have you ever had a serious reaction after receiving a vaccination?   No    Do you have a long-term health problem with heart disease, lung disease,  asthma, kidney disease, diabetes, anemia, metabolic or blood disease?   No    Do you have cancer, leukemia, AIDS, or any immune system problem?   No    Do you take cortisone, prednisone, other steroids, or anticancer drugs, or  have you had any x-ray (radiation) treatments?   No    Have you had a seizure, brain, or other nervous system problem?   No    During the past year, have you received a transfusion of blood or blood       products, or been given a medicine called immune (gamma) globulin?   No    For women: Are you pregnant or is there a chance you could become         pregnant during the next month?   No    Have you received any vaccinations in the past 4 weeks?   No     Immunization questionnaire answers were all negative.      MNVFC doesn't apply on this patient    Per orders of Dr. Medeiros, injection of Adacel given by Alana Suárez. Patient instructed to remain in clinic for 20 minutes afterwards, and to report any adverse reaction to me immediately.    Prior to injection verified patient identity using patient's name and date of birth.    VIS date for Tdap : 2/24/15       Screening performed by Alana Suárez, [facility]

## 2018-11-19 NOTE — LETTER
November 21, 2018      Arthur CHICHI Ronny  [address]  [facility] [address]        Dear ,    We are writing to inform you of your test results.    Your basic metabolic panel results (blood salts, blood sugar, and kidney function) are all normal.  Your lipid panel (cholesterol) results are improved from last year, which is great!  As you may know, an elevated cholesterol is one factor that increases your risk for heart disease and stroke.  You can improve your cholesterol by controlling the amount and type of fat you eat, and by increasing your daily activity level.  You are also a candidate for taking a daily statin medication to lower your risk for heart disease and stroke.  If you'd like to discuss that option further please schedule an appointment with me.      Here are some ways to improve your nutrition:      *   Eat less fat (such as butter) and no trans fats (such as margarine and Crisco).  Use polyunsaturated fats (such as olive oil) instead.      *   Buy lean cuts of meat and replace red meat with fish and seafood.      *   Eat more fruits and vegetables.      *   Eat a handful of tree nuts (such as almonds, walnuts, or cashews) every day.       *   Avoid fried or fast foods that are high in fat      *   Replace processed starches with whole grains (such as brown rice or whole grain breads, pastas, cereals) and legumes.    Resulted Orders   Lipid panel reflex to direct LDL Fasting   Result Value Ref Range    Cholesterol 221 (H) <200 mg/dL      Comment:      Desirable:       <200 mg/dl    Triglycerides 124 <150 mg/dL      Comment:      Fasting specimen    HDL Cholesterol 41 >39 mg/dL    LDL Cholesterol Calculated 155 (H) <100 mg/dL      Comment:      Above desirable:  100-129 mg/dl  Borderline High:  130-159 mg/dL  High:             160-189 mg/dL  Very high:       >189 mg/dl      Non HDL Cholesterol 180 (H) <130 mg/dL      Comment:      Above Desirable:  130-159 mg/dl  Borderline high:   160-189 mg/dl  High:             190-219 mg/dl  Very high:       >219 mg/dl     Basic metabolic panel   Result Value Ref Range    Sodium 137 133 - 144 mmol/L    Potassium 4.7 3.4 - 5.3 mmol/L    Chloride 107 94 - 109 mmol/L    Carbon Dioxide 24 20 - 32 mmol/L    Anion Gap 6 3 - 14 mmol/L    Glucose 99 70 - 99 mg/dL      Comment:      Fasting specimen    Urea Nitrogen 13 7 - 30 mg/dL    Creatinine 0.77 0.66 - 1.25 mg/dL    GFR Estimate >90 >60 mL/min/1.7m2      Comment:      Non  GFR Calc    GFR Estimate If Black >90 >60 mL/min/1.7m2      Comment:       GFR Calc    Calcium 9.1 8.5 - 10.1 mg/dL       If you have any questions or concerns, please call the clinic at the number listed above.       Sincerely,        Naty Medeiros MD/nr

## 2018-11-20 NOTE — PROGRESS NOTES
Please send results letter:  Your basic metabolic panel results (blood salts, blood sugar, and kidney function) are all normal.  Your lipid panel (cholesterol) results are improved from last year, which is great!  As you may know, an elevated cholesterol is one factor that increases your risk for heart disease and stroke.  You can improve your cholesterol by controlling the amount and type of fat you eat, and by increasing your daily activity level.  You are also a candidate for taking a daily statin medication to lower your risk for heart disease and stroke.  If you'd like to discuss that option further please schedule an appointment with me.      Here are some ways to improve your nutrition:      *   Eat less fat (such as butter) and no trans fats (such as margarine and Crisco).  Use polyunsaturated fats (such as olive oil) instead.      *   Buy lean cuts of meat and replace red meat with fish and seafood.      *   Eat more fruits and vegetables.      *   Eat a handful of tree nuts (such as almonds, walnuts, or cashews) every day.       *   Avoid fried or fast foods that are high in fat      *   Replace processed starches with whole grains (such as brown rice or whole grain breads, pastas, cereals) and legumes.    Naty Medeiros MD

## 2019-03-06 ENCOUNTER — OFFICE VISIT (OUTPATIENT)
Dept: DERMATOLOGY | Facility: CLINIC | Age: 64
End: 2019-03-06
Payer: COMMERCIAL

## 2019-03-06 VITALS — SYSTOLIC BLOOD PRESSURE: 120 MMHG | OXYGEN SATURATION: 97 % | DIASTOLIC BLOOD PRESSURE: 79 MMHG | HEART RATE: 65 BPM

## 2019-03-06 DIAGNOSIS — L81.4 LENTIGINES: ICD-10-CM

## 2019-03-06 DIAGNOSIS — L82.1 SEBORRHEIC KERATOSES: ICD-10-CM

## 2019-03-06 DIAGNOSIS — H02.65 XANTHELASMA OF LEFT LOWER EYELID: Primary | ICD-10-CM

## 2019-03-06 DIAGNOSIS — D18.01 CHERRY ANGIOMA: ICD-10-CM

## 2019-03-06 DIAGNOSIS — L85.3 XEROSIS OF SKIN: ICD-10-CM

## 2019-03-06 DIAGNOSIS — L29.9 LOCALIZED PRURITUS: ICD-10-CM

## 2019-03-06 DIAGNOSIS — D22.9 MULTIPLE BENIGN NEVI: ICD-10-CM

## 2019-03-06 DIAGNOSIS — Z85.828 HISTORY OF NONMELANOMA SKIN CANCER: ICD-10-CM

## 2019-03-06 PROCEDURE — 99214 OFFICE O/P EST MOD 30 MIN: CPT | Performed by: PHYSICIAN ASSISTANT

## 2019-03-06 NOTE — LETTER
3/6/2019         RE: Arthur Jefferson  [address]  [facility] [address]        Dear Colleague,    Thank you for referring your patient, Arthur Jefferson, to the [facility]. Please see a copy of my visit note below.    HPI:  Arthur Jefferson is a 64 year old male patient here today for FBE. History of BCC on left posterior shoulder, right vertex of scalp, and right post shoulder. Pt has a spot on his back and near left eye that have been present for a while. No changes . Patient reports the following symptoms: none .  Patient reports the following previous treatments: none.  Patient reports the following modifying factors: none.  Associated symptoms: none.  Patient has no other skin complaints today.  Remainder of the HPI, Meds, PMH, Allergies, FH, and SH was reviewed in chart.    Pertinent Hx:   BCC  Past Medical History:   Diagnosis Date     Basal cell carcinoma      BPH (benign prostatic hyperplasia)      Hyperlipidemia LDL goal <130      Prostate cancer (H) 3/3/2017    Dr. Hargrove, [facility]     Renal disease     multiple kidney stones       Past Surgical History:   Procedure Laterality Date     DAVINCI PROSTATECTOMY N/A 7/25/2017    Procedure: DAVINCI XI PROSTATECTOMY;  ROBOTIC ASSISTED  RADICAL PROSTATECTOMY ;  Surgeon: Oswaldo Hargrove MD;  Location: [facility]     HERNIORRHAPHY INGUINAL      bilateral, one open, one laparoscopic     LAPAROSCOPIC HERNIORRHAPHY INGUINAL Right 1/18/2017    Procedure: LAPAROSCOPIC HERNIORRHAPHY INGUINAL;  Surgeon: Kwame Prado MD;  Location:  [facility]        Family History   Problem Relation Age of Onset     Cancer Mother         liver     Prostate Cancer Father      Asthma No family hx of      Diabetes No family hx of      Hypertension No family hx of      Cerebrovascular Disease No family hx of      Breast Cancer No family hx of      Cancer - colorectal No family hx of      Alcohol/Drug No family hx of      Allergies No family hx  of      Alzheimer Disease No family hx of      Arthritis No family hx of      Blood Disease No family hx of      Cardiovascular No family hx of      Depression No family hx of      Eye Disorder No family hx of      Gastrointestinal Disease No family hx of      Heart Disease No family hx of      Lipids No family hx of      Musculoskeletal Disorder No family hx of      Neurologic Disorder No family hx of      Obesity No family hx of      Psychotic Disorder No family hx of      Respiratory No family hx of      Thyroid Disease No family hx of        Social History     Socioeconomic History     Marital status:      Spouse name: Jodi Jefferson     Number of children: 1     Years of education: Not on file     Highest education level: Not on file   Occupational History     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   Social Needs     Financial resource strain: Not on file     Food insecurity:     Worry: Not on file     Inability: Not on file     Transportation needs:     Medical: Not on file     Non-medical: Not on file   Tobacco Use     Smoking status: Former Smoker     Packs/day: 0.40     Years: 29.00     Pack years: 11.60     Last attempt to quit: 11/30/2008     Years since quitting: 10.2     Smokeless tobacco: Former User   Substance and Sexual Activity     Alcohol use: Yes     Alcohol/week: 1.2 oz     Types: 2 Standard drinks or equivalent per week     Comment: 4 weekly     Drug use: No     Sexual activity: Yes     Partners: Female   Lifestyle     Physical activity:     Days per week: Not on file     Minutes per session: Not on file     Stress: Not on file   Relationships     Social connections:     Talks on phone: Not on file     Gets together: Not on file     Attends Scientologist service: Not on file     Active member of club or organization: Not on file     Attends meetings of clubs or organizations: Not on file     Relationship status: Not on file     Intimate partner violence:      Fear of current or ex partner: Not on file     Emotionally abused: Not on file     Physically abused: Not on file     Forced sexual activity: Not on file   Other Topics Concern     Parent/sibling w/ CABG, MI or angioplasty before 65F 55M? No   Social History Narrative     Not on file       Outpatient Encounter Medications as of 3/6/2019   Medication Sig Dispense Refill     Ascorbic Acid (VITAMIN C PO) Take 1 tablet by mouth daily       calcium-magnesium (CALMAG) 500-250 MG TABS per tablet Take 1 tablet by mouth daily       multivitamin, therapeutic with minerals (THERA-VIT-M) TABS tablet Take 1 tablet by mouth every other day       VITAMIN E PO Take 1 tablet by mouth every other day       No facility-administered encounter medications on file as of 3/6/2019.        Review Of Systems:  Skin: As above  Eyes: negative  Ears/Nose/Throat: negative  Respiratory: No shortness of breath, dyspnea on exertion, cough, or hemoptysis  Cardiovascular: negative  Gastrointestinal: negative  Genitourinary: negative  Musculoskeletal: negative  Neurologic: negative  Psychiatric: negative  Hematologic/Lymphatic/Immunologic: negative  Endocrine: negative      Objective:     /79   Pulse 65   SpO2 97%   Eyes: Conjunctivae/lids: Normal   ENT: Lips:  Normal  MSK: Normal  Cardiovascular: Peripheral edema none  Pulm: Breathing Normal  Neuro/Psych: Orientation: Normal; Mood/Affect: Normal, NAD, WDWN  Pt accompanied by: self  Following areas examined: Scalp, face, eyelids, lips, neck, chest, abdomen, back, buttocks, and R&L upper and lower extremities.  Shepherd skin type:ii   Findings:  Red smooth well-defined macules on trunk and extremities.  Brown, stuck-on scaly appearing papules on trunk and extremities.  Well circumscribed macules with symmetric color distribution on trunk and extremities.  Tan WD smooth macules on face, neck, trunk, and extremities.  Wd smooth yellow papule on left inferior eyelid  Soft mobile smooth nodule  on right medial upper back 1.0cm  Hypopigmented smooth patch on right on left posterior shoulder, right vertex of scalp, and right post shoulder.   Generalized flaking of skin      Assessment and Plan:     1) Cherry angiomas, Seborrheic keratoses, Benign nevi, Lentigines     I discussed the specifics of tumor, prognosis, and genetics of benign lesions.  I explained that treatment of these lesions would be purely cosmetic and not medically neccessary.  I discussed with patient different removal options including excision, cryotherapy, cautery and /or laser.  Lesion may recur and/or may not completely resolve. May need additional treatment.     2) History of NMSC  No evidence of recurrence  Nicotinamide (Niacinamide) 500mg 2/x a day x 1year has shown to reduce the incidence of non-melanoma (squamous and basal cell) skin cancers as well as precancerous lesions (actinic keratoses).   Signs and Symptoms of non-melanoma skin cancer and ABCDEs of melanoma reviewed with patient. Patient encouraged to perform monthly self skin exams and educated on how to perform them. UV precautions reviewed with patient. Patient was asked about new or changing moles/lesions on body.   Wear a sunscreen with at least SPF 30 on your face, ears, neck and V of the chest daily. Wear sunscreen on other areas of the body if those areas are exposed to the sun throughout the day. Sunscreens can contain physical and/or chemical blockers. Physical blockers are less likely to clog pores, these include zinc oxide and titanium dioxide. Reapply every two hour and after swimming. Sunscreen examples include Neutrogena, CeraVe, Blue Lizard, Elta MD and many others.    3) xerosis of skin and localized pruritis  Disc moisturizing, Sarna sensitive and topical steroids.  Pt would like to try Sarna sensitive.  Consider purchasing Sarna SENSITIVE lotion- I usually recommend creams but this lotion has anti itch properties that does not have the risk of thinning out  the skin. Please use this in addition to your daily moisturizing cream if needed.  If you are unable to reach your back consider purchasing a lotion applicator.     4) xanthelasma  Pt has a history of hyperlipidemia  Follow up with PCP for regular monitoring of hyperlipidemia.   Disc removal. Pt defers.     Proper skin care from [facility]:    -Eliminate harsh soaps as they strip the natural oils from the skin, often resulting in dry itchy skin ( i.e. Dial, Zest, Suzanne Spring)  -Use mild soaps such as Cetaphil or Dove Sensitive Skin in the shower. You do not need to use soap on arms, legs, and trunk every time you shower unless visibly soiled.   -Avoid hot or cold showers.  -After showering, lightly dry off and apply moisturizing within 2-3 minutes. This will help trap moisture in the skin.   -Aggressive use of a moisturizer at least 1-2 times a day to the entire body (including -Vanicream, Cetaphil, Aquaphor or Cerave) and moisturize hands after every washing.  -We recommend using moisturizers that come in a tub that needs to be scooped out, not a pump. This has more of an oil base. It will hold moisture in your skin much better than a water base moisturizer. The above recommended are non-pore clogging.       Follow up in 12 months for FBE.       Again, thank you for allowing me to participate in the care of your patient.        Sincerely,        Meli Nelson PA-C

## 2019-03-06 NOTE — PATIENT INSTRUCTIONS
Nicotinamide (Niacinamide) 500mg 2/x a day x 1year has shown to reduce the incidence of non-melanoma (squamous and basal cell) skin cancers as well as precancerous lesions (actinic keratoses).       Consider purchasing Sarna SENSITIVE lotion- I usually recommend creams but this lotion has anti itch properties that does not have the risk of thinning out the skin. Please use this in addition to your daily moisturizing cream if needed.  If you are unable to reach your back consider purchasing a lotion applicator.         Proper skin care from [facility]:    -Eliminate harsh soaps as they strip the natural oils from the skin, often resulting in dry itchy skin ( i.e. Dial, Zest, [place] Spring)  -Use mild soaps such as Cetaphil or Dove Sensitive Skin in the shower. You do not need to use soap on arms, legs, and trunk every time you shower unless visibly soiled.   -Avoid hot or cold showers.  -After showering, lightly dry off and apply moisturizing within 2-3 minutes. This will help trap moisture in the skin.   -Aggressive use of a moisturizer at least 1-2 times a day to the entire body (including -Vanicream, Cetaphil, Aquaphor or Cerave) and moisturize hands after every washing.  -We recommend using moisturizers that come in a tub that needs to be scooped out, not a pump. This has more of an oil base. It will hold moisture in your skin much better than a water base moisturizer. The above recommended are non-pore clogging.           Wear a sunscreen with at least SPF 30 on your face, ears, neck and V of the chest daily. Wear sunscreen on other areas of the body if those areas are exposed to the sun throughout the day. Sunscreens can contain physical and/or chemical blockers. Physical blockers are less likely to clog pores, these include zinc oxide and titanium dioxide. Reapply every two hour and after swimming. Sunscreen examples include Neutrogena, CeraVe, Blue Lizard, Elta MD and many others.    UV  radiation  UVA radiation remains constant throughout the day and throughout the year. It is a longer wavelength than UVB and therefore penetrates deeper into the skin leading to immediate and delayed tanning, photoaging, and skin cancer. 70-80% of UVA and UVB radiation occurs between the hours of 10am-2pm.  UVB radiation  UVB radiation causes the most harmful effects and is more significant during the summer months. However, snow and ice can reflect UVB radiation leading to skin damage during the winter months as well. UVB radiation is responsible for tanning, burning, inflammation, delayed erythema (pinkness), pigmentation (brown spots), and skin cancer.   Just because you do not burn or are not developing a tan does not mean that you are not damaging your skin. A 15 minute drive to and from work for 30 years an lead to chronic sun damage of the skin. It is important to wear a broad spectrum (both UVA and UVB) sunscreen EVERY day with at least 30 SPF. Apply to face, ears, neck and v of the chest as this is where most of our sun exposure is. Reapply sunscreen every two hours if you plan on being outside.   Enrique Devlin. Clinical Dermatology: A Color Guide to Diagnosis and Therapy. Elsevier, 2016.

## 2019-03-07 NOTE — PROGRESS NOTES
HPI:  Arthur Jefferson is a 64 year old male patient here today for FBE. History of BCC on left posterior shoulder, right vertex of scalp, and right post shoulder. Pt has a spot on his back and near left eye that have been present for a while. No changes . Patient reports the following symptoms: none .  Patient reports the following previous treatments: none.  Patient reports the following modifying factors: none.  Associated symptoms: none.  Patient has no other skin complaints today.  Remainder of the HPI, Meds, PMH, Allergies, FH, and SH was reviewed in chart.    Pertinent Hx:   BCC  Past Medical History:   Diagnosis Date     Basal cell carcinoma      BPH (benign prostatic hyperplasia)      Hyperlipidemia LDL goal <130      Prostate cancer (H) 3/3/2017    Dr. Hargrove, [facility]     Renal disease     multiple kidney stones       Past Surgical History:   Procedure Laterality Date     DAVINCI PROSTATECTOMY N/A 7/25/2017    Procedure: DAVINCI XI PROSTATECTOMY;  ROBOTIC ASSISTED  RADICAL PROSTATECTOMY ;  Surgeon: Oswaldo Hargrove MD;  Location:  OR     HERNIORRHAPHY INGUINAL      bilateral, one open, one laparoscopic     LAPAROSCOPIC HERNIORRHAPHY INGUINAL Right 1/18/2017    Procedure: LAPAROSCOPIC HERNIORRHAPHY INGUINAL;  Surgeon: Kwame Prado MD;  [facility]        Family History   Problem Relation Age of Onset     Cancer Mother         liver     Prostate Cancer Father      Asthma No family hx of      Diabetes No family hx of      Hypertension No family hx of      Cerebrovascular Disease No family hx of      Breast Cancer No family hx of      Cancer - colorectal No family hx of      Alcohol/Drug No family hx of      Allergies No family hx of      Alzheimer Disease No family hx of      Arthritis No family hx of      Blood Disease No family hx of      Cardiovascular No family hx of      Depression No family hx of      Eye Disorder No family hx of      Gastrointestinal Disease No family hx of       Heart Disease No family hx of      Lipids No family hx of      Musculoskeletal Disorder No family hx of      Neurologic Disorder No family hx of      Obesity No family hx of      Psychotic Disorder No family hx of      Respiratory No family hx of      Thyroid Disease No family hx of        Social History     Socioeconomic History     Marital status:      Spouse name: Jodi Jefferson     Number of children: 1     Years of education: Not on file     Highest education level: Not on file   Occupational History     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   Social Needs     Financial resource strain: Not on file     Food insecurity:     Worry: Not on file     Inability: Not on file     Transportation needs:     Medical: Not on file     Non-medical: Not on file   Tobacco Use     Smoking status: Former Smoker     Packs/day: 0.40     Years: 29.00     Pack years: 11.60     Last attempt to quit: 11/30/2008     Years since quitting: 10.2     Smokeless tobacco: Former User   Substance and Sexual Activity     Alcohol use: Yes     Alcohol/week: 1.2 oz     Types: 2 Standard drinks or equivalent per week     Comment: 4 weekly     Drug use: No     Sexual activity: Yes     Partners: Female   Lifestyle     Physical activity:     Days per week: Not on file     Minutes per session: Not on file     Stress: Not on file   Relationships     Social connections:     Talks on phone: Not on file     Gets together: Not on file     Attends Tenriism service: Not on file     Active member of club or organization: Not on file     Attends meetings of clubs or organizations: Not on file     Relationship status: Not on file     Intimate partner violence:     Fear of current or ex partner: Not on file     Emotionally abused: Not on file     Physically abused: Not on file     Forced sexual activity: Not on file   Other Topics Concern     Parent/sibling w/ CABG, MI or angioplasty before 65F 55M? No   Social  History Narrative     Not on file       Outpatient Encounter Medications as of 3/6/2019   Medication Sig Dispense Refill     Ascorbic Acid (VITAMIN C PO) Take 1 tablet by mouth daily       calcium-magnesium (CALMAG) 500-250 MG TABS per tablet Take 1 tablet by mouth daily       multivitamin, therapeutic with minerals (THERA-VIT-M) TABS tablet Take 1 tablet by mouth every other day       VITAMIN E PO Take 1 tablet by mouth every other day       No facility-administered encounter medications on file as of 3/6/2019.        Review Of Systems:  Skin: As above  Eyes: negative  Ears/Nose/Throat: negative  Respiratory: No shortness of breath, dyspnea on exertion, cough, or hemoptysis  Cardiovascular: negative  Gastrointestinal: negative  Genitourinary: negative  Musculoskeletal: negative  Neurologic: negative  Psychiatric: negative  Hematologic/Lymphatic/Immunologic: negative  Endocrine: negative      Objective:     /79   Pulse 65   SpO2 97%   Eyes: Conjunctivae/lids: Normal   ENT: Lips:  Normal  MSK: Normal  Cardiovascular: Peripheral edema none  Pulm: Breathing Normal  Neuro/Psych: Orientation: Normal; Mood/Affect: Normal, NAD, WDWN  Pt accompanied by: self  Following areas examined: Scalp, face, eyelids, lips, neck, chest, abdomen, back, buttocks, and R&L upper and lower extremities.  Shepherd skin type:ii   Findings:  Red smooth well-defined macules on trunk and extremities.  Brown, stuck-on scaly appearing papules on trunk and extremities.  Well circumscribed macules with symmetric color distribution on trunk and extremities.  Tan WD smooth macules on face, neck, trunk, and extremities.  Wd smooth yellow papule on left inferior eyelid  Soft mobile smooth nodule on right medial upper back 1.0cm  Hypopigmented smooth patch on right on left posterior shoulder, right vertex of scalp, and right post shoulder.   Generalized flaking of skin      Assessment and Plan:     1) Cherry angiomas, Seborrheic keratoses,  Benign nevi, Lentigines     I discussed the specifics of tumor, prognosis, and genetics of benign lesions.  I explained that treatment of these lesions would be purely cosmetic and not medically neccessary.  I discussed with patient different removal options including excision, cryotherapy, cautery and /or laser.  Lesion may recur and/or may not completely resolve. May need additional treatment.     2) History of NMSC  No evidence of recurrence  Nicotinamide (Niacinamide) 500mg 2/x a day x 1year has shown to reduce the incidence of non-melanoma (squamous and basal cell) skin cancers as well as precancerous lesions (actinic keratoses).   Signs and Symptoms of non-melanoma skin cancer and ABCDEs of melanoma reviewed with patient. Patient encouraged to perform monthly self skin exams and educated on how to perform them. UV precautions reviewed with patient. Patient was asked about new or changing moles/lesions on body.   Wear a sunscreen with at least SPF 30 on your face, ears, neck and V of the chest daily. Wear sunscreen on other areas of the body if those areas are exposed to the sun throughout the day. Sunscreens can contain physical and/or chemical blockers. Physical blockers are less likely to clog pores, these include zinc oxide and titanium dioxide. Reapply every two hour and after swimming. Sunscreen examples include Neutrogena, CeraVe, Blue Lizard, Elta MD and many others.    3) xerosis of skin and localized pruritis  Disc moisturizing, Sarna sensitive and topical steroids.  Pt would like to try Sarna sensitive.  Consider purchasing Sarna SENSITIVE lotion- I usually recommend creams but this lotion has anti itch properties that does not have the risk of thinning out the skin. Please use this in addition to your daily moisturizing cream if needed.  If you are unable to reach your back consider purchasing a lotion applicator.     4) xanthelasma  Pt has a history of hyperlipidemia  Follow up with PCP for regular  monitoring of hyperlipidemia.   Disc removal. Pt defers.     Proper skin care from [facility]:    -Eliminate harsh soaps as they strip the natural oils from the skin, often resulting in dry itchy skin ( i.e. Dial, Zest, Suzanne Spring)  -Use mild soaps such as Cetaphil or Dove Sensitive Skin in the shower. You do not need to use soap on arms, legs, and trunk every time you shower unless visibly soiled.   -Avoid hot or cold showers.  -After showering, lightly dry off and apply moisturizing within 2-3 minutes. This will help trap moisture in the skin.   -Aggressive use of a moisturizer at least 1-2 times a day to the entire body (including -Vanicream, Cetaphil, Aquaphor or Cerave) and moisturize hands after every washing.  -We recommend using moisturizers that come in a tub that needs to be scooped out, not a pump. This has more of an oil base. It will hold moisture in your skin much better than a water base moisturizer. The above recommended are non-pore clogging.       Follow up in 12 months for FBE.

## 2019-12-18 NOTE — PROGRESS NOTES
SUBJECTIVE:   CC: Arthur Jefferson is an 64 year old male who presents for preventative health visit.     Healthy Habits:     Getting at least 3 servings of Calcium per day:  Yes    Bi-annual eye exam:  Yes    Dental care twice a year:  Yes    Sleep apnea or symptoms of sleep apnea:  None    Diet:  Regular (no restrictions)    Frequency of exercise:  6-7 days/week    Duration of exercise:  15-30 minutes    Taking medications regularly:  Yes    Medication side effects:  Not applicable    PHQ-2 Total Score: 0    Additional concerns today:  No      Today's PHQ-2 Score:   PHQ-2 (  [employer]) 2019   Q1: Little interest or pleasure in doing things 0   Q2: Feeling down, depressed or hopeless 0   PHQ-2 Score 0   Q1: Little interest or pleasure in doing things Not at all   Q2: Feeling down, depressed or hopeless Not at all   PHQ-2 Score 0       Abuse: Current or Past(Physical, Sexual or Emotional)- No  Do you feel safe in your environment? Yes    Have you ever done Advance Care Planning? (For example, a Health Directive, POLST, or a discussion with a medical provider or your loved ones about your wishes): Yes, patient states has an Advance Care Planning document and will bring a copy to the clinic.    Social History     Tobacco Use     Smoking status: Former Smoker     Packs/day: 0.40     Years: 29.00     Pack years: 11.60     Last attempt to quit: 2008     Years since quittin.0     Smokeless tobacco: Former User   Substance Use Topics     Alcohol use: Yes     Alcohol/week: 2.0 standard drinks     Types: 2 Standard drinks or equivalent per week     Comment: 4 weekly         Alcohol Use 2019   Prescreen: >3 drinks/day or >7 drinks/week? No   Prescreen: >3 drinks/day or >7 drinks/week? -       Last PSA:   PSA   Date Value Ref Range Status   2008 2.78 0 - 4 ug/L Final       Reviewed orders with patient. Reviewed health maintenance and updated orders accordingly - Yes  BP Readings from Last 3  Encounters:   12/19/19 104/66   03/06/19 120/79   11/19/18 122/64    Wt Readings from Last 3 Encounters:   12/19/19 70.1 kg (154 lb 8 oz)   11/19/18 71.6 kg (157 lb 12 oz)   04/09/18 73.5 kg (162 lb 1.6 oz)           Reviewed and updated as needed this visit by clinical staff  Tobacco  Allergies  Meds  Problems  Med Hx  Surg Hx  Fam Hx  Soc Hx          Reviewed and updated as needed this visit by Provider  Meds  Problems        Past Medical History:   Diagnosis Date     Basal cell carcinoma      BPH (benign prostatic hyperplasia)      Hyperlipidemia LDL goal <130      Prostate cancer (H) 3/3/2017    Dr. Hargrove, [facility]     Renal disease     multiple kidney stones      Past Surgical History:   Procedure Laterality Date     DAVINCI PROSTATECTOMY N/A 7/25/2017    Procedure: DAVINCI XI PROSTATECTOMY;  ROBOTIC ASSISTED  RADICAL PROSTATECTOMY ;  Surgeon: Oswaldo Hargrove MD;  Location:  OR     HERNIORRHAPHY INGUINAL      bilateral, one open, one laparoscopic     LAPAROSCOPIC HERNIORRHAPHY INGUINAL Right 1/18/2017    Procedure: LAPAROSCOPIC HERNIORRHAPHY INGUINAL;  Surgeon: Kwame Prado MD;  Location: [facility]       Review of Systems   Constitutional: Negative for chills and fever.   HENT: Negative for congestion, ear pain, hearing loss and sore throat.    Eyes: Negative for pain and visual disturbance.   Respiratory: Negative for cough and shortness of breath.    Cardiovascular: Negative for chest pain, palpitations and peripheral edema.   Gastrointestinal: Negative for abdominal pain, constipation, diarrhea, heartburn, hematochezia and nausea.   Genitourinary: Negative for discharge, dysuria, frequency, genital sores, hematuria, impotence and urgency.   Musculoskeletal: Negative for arthralgias, joint swelling and myalgias.   Skin: Negative for rash.   Neurological: Negative for dizziness, weakness, headaches and paresthesias.   Psychiatric/Behavioral: Negative for mood changes. The patient is  "not nervous/anxious.          OBJECTIVE:   /66 (BP Location: Right arm, Patient Position: Chair, Cuff Size: Adult Regular)   Pulse 59   Temp 97.5  F (36.4  C) (Oral)   Resp 16   Ht 1.651 m (5' 5\")   Wt 70.1 kg (154 lb 8 oz)   SpO2 98%   BMI 25.71 kg/m      Physical Exam  GENERAL: healthy, alert and no distress  EYES: Eyes grossly normal to inspection, conjunctivae and sclerae normal  HENT: ear canals and TM's normal, nose and mouth without ulcers or lesions  NECK: no adenopathy, no asymmetry, masses, or scars and thyroid normal to palpation  RESP: lungs clear to auscultation - no rales, rhonchi or wheezes  CV: regular rate and rhythm, normal S1 S2, no S3 or S4, no murmur, click or rub, no peripheral edema   ABDOMEN: soft, nontender, no hepatosplenomegaly, no masses   MS: no gross musculoskeletal defects noted, no edema  SKIN: no suspicious lesions or rashes  NEURO: Normal strength and tone, mentation intact and speech normal  PSYCH: mentation appears normal, affect normal/bright    Diagnostic Test Results:  Labs reviewed in Epic    ASSESSMENT/PLAN:       ICD-10-CM    1. Routine general medical examination at a health care facility Z00.00    2. Prostate cancer (H) C61 PSA, tumor marker   3. Elevated fasting glucose R73.01 Glucose   4. Lipid screening Z13.220 Lipid panel reflex to direct LDL Fasting       COUNSELING:   Reviewed preventive health counseling, as reflected in patient instructions    Estimated body mass index is 25.71 kg/m  as calculated from the following:    Height as of this encounter: 1.651 m (5' 5\").    Weight as of this encounter: 70.1 kg (154 lb 8 oz).        reports that he quit smoking about 11 years ago. He has a 11.60 pack-year smoking history. He has quit using smokeless tobacco.      Counseling Resources:  ATP IV Guidelines  Pooled Cohorts Equation Calculator  FRAX Risk Assessment  ICSI Preventive Guidelines  Dietary Guidelines for Americans, 2010  USDA's MyPlate  ASA " Prophylaxis  Lung CA Screening    Naty Medeiros MD  [facility]

## 2019-12-19 ENCOUNTER — OFFICE VISIT (OUTPATIENT)
Dept: FAMILY MEDICINE | Facility: CLINIC | Age: 64
End: 2019-12-19
Payer: COMMERCIAL

## 2019-12-19 VITALS
HEIGHT: 65 IN | DIASTOLIC BLOOD PRESSURE: 66 MMHG | TEMPERATURE: 97.5 F | BODY MASS INDEX: 25.74 KG/M2 | RESPIRATION RATE: 16 BRPM | HEART RATE: 59 BPM | WEIGHT: 154.5 LBS | OXYGEN SATURATION: 98 % | SYSTOLIC BLOOD PRESSURE: 104 MMHG

## 2019-12-19 DIAGNOSIS — Z00.00 ROUTINE GENERAL MEDICAL EXAMINATION AT A HEALTH CARE FACILITY: Primary | ICD-10-CM

## 2019-12-19 DIAGNOSIS — R73.01 ELEVATED FASTING GLUCOSE: ICD-10-CM

## 2019-12-19 DIAGNOSIS — C61 PROSTATE CANCER (H): ICD-10-CM

## 2019-12-19 DIAGNOSIS — Z13.220 LIPID SCREENING: ICD-10-CM

## 2019-12-19 PROCEDURE — 36415 COLL VENOUS BLD VENIPUNCTURE: CPT | Performed by: FAMILY MEDICINE

## 2019-12-19 PROCEDURE — 82947 ASSAY GLUCOSE BLOOD QUANT: CPT | Performed by: FAMILY MEDICINE

## 2019-12-19 PROCEDURE — 99396 PREV VISIT EST AGE 40-64: CPT | Performed by: FAMILY MEDICINE

## 2019-12-19 PROCEDURE — 84153 ASSAY OF PSA TOTAL: CPT | Performed by: FAMILY MEDICINE

## 2019-12-19 PROCEDURE — 80061 LIPID PANEL: CPT | Performed by: FAMILY MEDICINE

## 2019-12-19 ASSESSMENT — ENCOUNTER SYMPTOMS
HEARTBURN: 0
CHILLS: 0
JOINT SWELLING: 0
COUGH: 0
NERVOUS/ANXIOUS: 0
SORE THROAT: 0
PARESTHESIAS: 0
PALPITATIONS: 0
ABDOMINAL PAIN: 0
ARTHRALGIAS: 0
EYE PAIN: 0
FEVER: 0
FREQUENCY: 0
CONSTIPATION: 0
DIZZINESS: 0
DIARRHEA: 0
HEMATURIA: 0
NAUSEA: 0
DYSURIA: 0
HEADACHES: 0
MYALGIAS: 0
SHORTNESS OF BREATH: 0
WEAKNESS: 0
HEMATOCHEZIA: 0

## 2019-12-19 ASSESSMENT — MIFFLIN-ST. JEOR: SCORE: 1417.69

## 2019-12-20 LAB
CHOLEST SERPL-MCNC: 219 MG/DL
GLUCOSE SERPL-MCNC: 88 MG/DL (ref 70–99)
HDLC SERPL-MCNC: 45 MG/DL
LDLC SERPL CALC-MCNC: 162 MG/DL
NONHDLC SERPL-MCNC: 174 MG/DL
PSA SERPL-MCNC: 0.12 UG/L (ref 0–4)
TRIGL SERPL-MCNC: 62 MG/DL

## 2019-12-22 NOTE — RESULT ENCOUNTER NOTE
Please send results letter:  It was good to see you.  Your PSA level (prostate cancer blood test) is excellent.  Your blood sugar is normal.  Your lipid panel (cholesterol) results are high but stable.  By current guidelines you are a candidate for cholesterol-lowering (statin) medication.  That may help lower your risk for cardiovascular diseases such as heart attack and stroke.  If you are interested in starting medication please schedule an appointment with me to discuss this.      As you may know, an elevated cholesterol is one factor that increases your risk for heart disease and stroke.  You can improve your cholesterol by controlling the amount and type of fat you eat, and by increasing your daily activity level.    Here are some ways to improve your nutrition:      *   Eat less fat (such as butter) and no trans fats (such as margarine and Crisco).  Use polyunsaturated fats (such as olive oil) instead.      *   Buy lean cuts of meat and replace red meat with fish and seafood.      *   Eat more fruits and vegetables.      *   Eat a handful of tree nuts (such as almonds, walnuts, or cashews) every day.       *   Avoid fried or fast foods that are high in fat      *   Replace processed starches with whole grains (such as brown rice or whole grain breads, pastas, cereals) and legumes.    Naty Medeiros MD

## 2020-08-01 NOTE — INTERVAL H&P NOTE
This note is for the purpose of making the H&P performed in clinic within the last 30 days available in the hospital encounter.  
Depression

## (undated) DEVICE — ESU ELEC BLADE 2.75" COATED/INSULATED E1455

## (undated) DEVICE — DRAIN CHANNEL ROUND 15FR FLUTED 072188

## (undated) DEVICE — CLIP ENDO HEMO-LOC PURPLE LG 544240

## (undated) DEVICE — DAVINCI HOT SHEARS TIP COVER  400180

## (undated) DEVICE — SUCTION CANISTER MEDIVAC LINER 3000ML W/LID 65651-530

## (undated) DEVICE — SOL NACL 0.9% IRRIG 1000ML BOTTLE 07138-09

## (undated) DEVICE — SUPPORTER ATHLETIC LG LATEX 202636

## (undated) DEVICE — CATH FOLEY 2WAY 20FR 30ML LUBRICATH LATEX 0166L20

## (undated) DEVICE — TUBING CONMED AIRSEAL SMOKE EVAC INSUFFLATION ASM-EVAC

## (undated) DEVICE — GLOVE GAMMEX DERMAPRENE ULTRA SZ 8 LF 8516

## (undated) DEVICE — SURGICEL HEMOSTAT 3X4" NUKNIT 1943

## (undated) DEVICE — ESU GROUND PAD UNIVERSAL W/O CORD

## (undated) DEVICE — CATH TRAY FOLEY SURESTEP 16FR WDRAIN BAG STLK LATEX A300316A

## (undated) DEVICE — SOL NACL 0.9% INJ 1000ML BAG 2B1324X

## (undated) DEVICE — SU VICRYL 0 UR-6 27" J603H

## (undated) DEVICE — LINEN TOWEL PACK X5 5464

## (undated) DEVICE — ESU CORD MONOPOLAR 10'  E0510

## (undated) DEVICE — ENDO POUCH GOLD 10MM ECATCH 173050G

## (undated) DEVICE — KIT PATIENT POSITIONING PIGAZZI LATEX FREE 40580

## (undated) DEVICE — ENDO TROCAR BLUNT TIP KII BALLOON 12X100MM C0R47

## (undated) DEVICE — PACK LAP CHOLE SLC15LCFSD

## (undated) DEVICE — PREP CHLORAPREP 26ML TINTED ORANGE  260815

## (undated) DEVICE — DAVINCI XI DRAPE COLUMN 470341

## (undated) DEVICE — GLOVE PROTEXIS BLUE W/NEU-THERA 8.5  2D73EB85

## (undated) DEVICE — WIPES FOLEY CARE SURESTEP PROVON DFC100

## (undated) DEVICE — SOL WATER IRRIG 1000ML BOTTLE 2F7114

## (undated) DEVICE — DAVINCI XI GRASPER ENDOWRIST BIPOLAR LONG 470400

## (undated) DEVICE — SU VICRYL 3-0 SH 27" J316H

## (undated) DEVICE — DRAIN JACKSON PRATT RESERVOIR 100ML SU130-1305

## (undated) DEVICE — ENDO SHEARS 5MM 176643

## (undated) DEVICE — DAVINCI XI DRAPE ARM 470015

## (undated) DEVICE — ESU PENCIL W/HOLSTER

## (undated) DEVICE — SU ETHILON 2-0 FS 18" 664H

## (undated) DEVICE — ENDO TROCAR 12MM VERSASTEP VS101012P

## (undated) DEVICE — SET EXTENSION MICROBORE 2.0ML 73" EB-072-01

## (undated) DEVICE — ENDO TROCAR 05.5MM PEDI 24055

## (undated) DEVICE — NDL INSUFFLATION 14GA STEP S100000

## (undated) DEVICE — SU MONOCRYL 3-0 RB-1 27" UND Y215H

## (undated) DEVICE — ENDO TROCAR DISSECTING BALLOON OMS-PDBS2

## (undated) DEVICE — SU VICRYL 0 UR-5 27" J376H

## (undated) DEVICE — SU MONOCRYL 4-0 PS-2 18" UND Y496G

## (undated) DEVICE — BLADE CLIPPER 4406

## (undated) DEVICE — GLOVE PROTEXIS W/NEU-THERA 7.5  2D73TE75

## (undated) DEVICE — GLOVE PROTEXIS W/NEU-THERA 8.0  2D73TE80

## (undated) DEVICE — SU VICRYL 0 CT-1 27" J340H

## (undated) DEVICE — PACK DAVINCI UROLOGY SBA15UDFSG

## (undated) DEVICE — SU MONOCRYL 3-0 RB-1 27" Y305H

## (undated) DEVICE — DAVINCI XI SEAL UNIVERSAL 5-8MM 470361

## (undated) DEVICE — ENDO TROCAR CONMED AIRSEAL BLADELESS 05X120MM IAS5-120LP

## (undated) DEVICE — SUCTION IRR TRUMPET VALVE LAP ASU1201

## (undated) RX ORDER — PROPOFOL 10 MG/ML
INJECTION, EMULSION INTRAVENOUS
Status: DISPENSED
Start: 2017-01-18

## (undated) RX ORDER — ONDANSETRON 2 MG/ML
INJECTION INTRAMUSCULAR; INTRAVENOUS
Status: DISPENSED
Start: 2017-07-25

## (undated) RX ORDER — HYDROMORPHONE HYDROCHLORIDE 1 MG/ML
INJECTION, SOLUTION INTRAMUSCULAR; INTRAVENOUS; SUBCUTANEOUS
Status: DISPENSED
Start: 2017-07-25

## (undated) RX ORDER — CEFAZOLIN SODIUM 2 G/100ML
INJECTION, SOLUTION INTRAVENOUS
Status: DISPENSED
Start: 2017-07-25

## (undated) RX ORDER — GLYCOPYRROLATE 0.2 MG/ML
INJECTION, SOLUTION INTRAMUSCULAR; INTRAVENOUS
Status: DISPENSED
Start: 2017-07-25

## (undated) RX ORDER — LIDOCAINE HYDROCHLORIDE 20 MG/ML
INJECTION, SOLUTION EPIDURAL; INFILTRATION; INTRACAUDAL; PERINEURAL
Status: DISPENSED
Start: 2017-01-18

## (undated) RX ORDER — ACETAMINOPHEN 10 MG/ML
INJECTION, SOLUTION INTRAVENOUS
Status: DISPENSED
Start: 2017-07-25

## (undated) RX ORDER — FENTANYL CITRATE 50 UG/ML
INJECTION, SOLUTION INTRAMUSCULAR; INTRAVENOUS
Status: DISPENSED
Start: 2017-01-18

## (undated) RX ORDER — FENTANYL CITRATE 50 UG/ML
INJECTION, SOLUTION INTRAMUSCULAR; INTRAVENOUS
Status: DISPENSED
Start: 2017-07-25

## (undated) RX ORDER — CEFAZOLIN SODIUM 1 G/3ML
INJECTION, POWDER, FOR SOLUTION INTRAMUSCULAR; INTRAVENOUS
Status: DISPENSED
Start: 2017-07-25

## (undated) RX ORDER — ONDANSETRON 2 MG/ML
INJECTION INTRAMUSCULAR; INTRAVENOUS
Status: DISPENSED
Start: 2017-01-18

## (undated) RX ORDER — CEFAZOLIN SODIUM 2 G/100ML
INJECTION, SOLUTION INTRAVENOUS
Status: DISPENSED
Start: 2017-01-18

## (undated) RX ORDER — DEXAMETHASONE SODIUM PHOSPHATE 4 MG/ML
INJECTION, SOLUTION INTRA-ARTICULAR; INTRALESIONAL; INTRAMUSCULAR; INTRAVENOUS; SOFT TISSUE
Status: DISPENSED
Start: 2017-01-18

## (undated) RX ORDER — PROPOFOL 10 MG/ML
INJECTION, EMULSION INTRAVENOUS
Status: DISPENSED
Start: 2017-07-25

## (undated) RX ORDER — KETOROLAC TROMETHAMINE 30 MG/ML
INJECTION, SOLUTION INTRAMUSCULAR; INTRAVENOUS
Status: DISPENSED
Start: 2017-01-18

## (undated) RX ORDER — LIDOCAINE HYDROCHLORIDE 20 MG/ML
INJECTION, SOLUTION EPIDURAL; INFILTRATION; INTRACAUDAL; PERINEURAL
Status: DISPENSED
Start: 2017-07-25

## (undated) RX ORDER — DEXAMETHASONE SODIUM PHOSPHATE 4 MG/ML
INJECTION, SOLUTION INTRA-ARTICULAR; INTRALESIONAL; INTRAMUSCULAR; INTRAVENOUS; SOFT TISSUE
Status: DISPENSED
Start: 2017-07-25

## (undated) RX ORDER — BUPIVACAINE HYDROCHLORIDE 2.5 MG/ML
INJECTION, SOLUTION EPIDURAL; INFILTRATION; INTRACAUDAL
Status: DISPENSED
Start: 2017-07-25

## (undated) RX ORDER — HYDROCODONE BITARTRATE AND ACETAMINOPHEN 5; 325 MG/1; MG/1
TABLET ORAL
Status: DISPENSED
Start: 2017-01-18